# Patient Record
Sex: FEMALE | Race: WHITE | Employment: UNEMPLOYED | URBAN - METROPOLITAN AREA
[De-identification: names, ages, dates, MRNs, and addresses within clinical notes are randomized per-mention and may not be internally consistent; named-entity substitution may affect disease eponyms.]

---

## 2018-03-15 ENCOUNTER — HOSPITAL ENCOUNTER (OUTPATIENT)
Age: 58
Discharge: HOME | End: 2018-03-17
Payer: MEDICAID

## 2018-03-15 LAB
ALBUMIN SERPL-MCNC: 4.3 G/DL (ref 3.5–5.2)
ALP BLD-CCNC: 63 U/L (ref 35–104)
ALT SERPL-CCNC: 15 U/L (ref 0–32)
ANION GAP SERPL CALCULATED.3IONS-SCNC: 17 MMOL/L (ref 7–16)
AST SERPL-CCNC: 16 U/L (ref 0–31)
BILIRUB SERPL-MCNC: 0.4 MG/DL (ref 0–1.2)
BUN BLDV-MCNC: 19 MG/DL (ref 6–20)
CALCIUM SERPL-MCNC: 9.7 MG/DL (ref 8.6–10.2)
CHLORIDE BLD-SCNC: 100 MMOL/L (ref 98–107)
CHOLESTEROL, TOTAL: 182 MG/DL (ref 0–199)
CO2: 24 MMOL/L (ref 22–29)
CREAT SERPL-MCNC: 0.8 MG/DL (ref 0.5–1)
GFR AFRICAN AMERICAN: >60
GFR NON-AFRICAN AMERICAN: >60 ML/MIN/1.73
GLUCOSE BLD-MCNC: 114 MG/DL (ref 74–109)
HBA1C MFR BLD: 6.1 % (ref 4.8–5.9)
HDLC SERPL-MCNC: 31 MG/DL
LDL CHOLESTEROL CALCULATED: 116 MG/DL (ref 0–99)
POTASSIUM SERPL-SCNC: 4.2 MMOL/L (ref 3.5–5)
SODIUM BLD-SCNC: 141 MMOL/L (ref 132–146)
TOTAL PROTEIN: 8.5 G/DL (ref 6.4–8.3)
TRIGL SERPL-MCNC: 174 MG/DL (ref 0–149)
URIC ACID, SERUM: 6.9 MG/DL (ref 2.4–5.7)
VITAMIN D 25-HYDROXY: 45 NG/ML (ref 30–100)
VLDLC SERPL CALC-MCNC: 35 MG/DL

## 2018-03-15 PROCEDURE — 80053 COMPREHEN METABOLIC PANEL: CPT

## 2018-03-15 PROCEDURE — 80061 LIPID PANEL: CPT

## 2018-03-15 PROCEDURE — 83036 HEMOGLOBIN GLYCOSYLATED A1C: CPT

## 2018-03-15 PROCEDURE — 84550 ASSAY OF BLOOD/URIC ACID: CPT

## 2018-03-15 PROCEDURE — 82306 VITAMIN D 25 HYDROXY: CPT

## 2018-04-11 ENCOUNTER — HOSPITAL ENCOUNTER (OUTPATIENT)
Dept: GENERAL RADIOLOGY | Age: 58
Discharge: HOME | End: 2018-04-13
Payer: MEDICAID

## 2018-04-11 ENCOUNTER — HOSPITAL ENCOUNTER (OUTPATIENT)
Dept: CT IMAGING | Age: 58
Discharge: HOME | End: 2018-04-11
Payer: MEDICAID

## 2018-04-11 ENCOUNTER — HOSPITAL ENCOUNTER (OUTPATIENT)
Age: 58
Discharge: HOME | End: 2018-04-13
Payer: MEDICAID

## 2018-04-11 DIAGNOSIS — C64.1 MALIGNANT NEOPLASM OF RIGHT KIDNEY, EXCEPT RENAL PELVIS (HCC): ICD-10-CM

## 2018-04-11 PROCEDURE — 71046 X-RAY EXAM CHEST 2 VIEWS: CPT

## 2018-04-11 PROCEDURE — 74160 CT ABDOMEN W/CONTRAST: CPT

## 2018-04-11 PROCEDURE — 6360000004 HC RX CONTRAST MEDICATION: Performed by: RADIOLOGY

## 2018-04-11 RX ADMIN — IOPAMIDOL 80 ML: 755 INJECTION, SOLUTION INTRAVENOUS at 10:24

## 2018-06-26 ENCOUNTER — HOSPITAL ENCOUNTER (OUTPATIENT)
Age: 58
Discharge: HOME | End: 2018-06-28
Payer: MEDICAID

## 2018-06-26 PROCEDURE — 80048 BASIC METABOLIC PNL TOTAL CA: CPT

## 2018-06-27 LAB
ANION GAP SERPL CALCULATED.3IONS-SCNC: 17 MMOL/L (ref 7–16)
BUN BLDV-MCNC: 21 MG/DL (ref 6–20)
CALCIUM SERPL-MCNC: 9.9 MG/DL (ref 8.6–10.2)
CHLORIDE BLD-SCNC: 96 MMOL/L (ref 98–107)
CO2: 26 MMOL/L (ref 22–29)
CREAT SERPL-MCNC: 1 MG/DL (ref 0.5–1)
GFR AFRICAN AMERICAN: >60
GFR NON-AFRICAN AMERICAN: 57 ML/MIN/1.73
GLUCOSE BLD-MCNC: 106 MG/DL (ref 74–109)
POTASSIUM SERPL-SCNC: 4 MMOL/L (ref 3.5–5)
SODIUM BLD-SCNC: 139 MMOL/L (ref 132–146)

## 2018-11-06 ENCOUNTER — HOSPITAL ENCOUNTER (OUTPATIENT)
Dept: MAMMOGRAPHY | Age: 58
Discharge: HOME | End: 2018-11-08
Payer: MEDICAID

## 2018-11-06 DIAGNOSIS — Z12.39 BREAST CANCER SCREENING: ICD-10-CM

## 2018-11-06 PROCEDURE — 77067 SCR MAMMO BI INCL CAD: CPT

## 2018-12-10 ENCOUNTER — HOSPITAL ENCOUNTER (OUTPATIENT)
Age: 58
Discharge: HOME | End: 2018-12-12
Payer: MEDICAID

## 2018-12-10 LAB
ALBUMIN SERPL-MCNC: 4.2 G/DL (ref 3.5–5.2)
ALP BLD-CCNC: 59 U/L (ref 35–104)
ALT SERPL-CCNC: 11 U/L (ref 0–32)
ANION GAP SERPL CALCULATED.3IONS-SCNC: 14 MMOL/L (ref 7–16)
AST SERPL-CCNC: 12 U/L (ref 0–31)
BACTERIA: ABNORMAL /HPF
BASOPHILS ABSOLUTE: 0.03 E9/L (ref 0–0.2)
BASOPHILS RELATIVE PERCENT: 0.4 % (ref 0–2)
BILIRUB SERPL-MCNC: 0.5 MG/DL (ref 0–1.2)
BILIRUBIN URINE: NEGATIVE
BLOOD, URINE: ABNORMAL
BUN BLDV-MCNC: 21 MG/DL (ref 6–20)
CALCIUM SERPL-MCNC: 9.8 MG/DL (ref 8.6–10.2)
CHLORIDE BLD-SCNC: 99 MMOL/L (ref 98–107)
CHOLESTEROL, TOTAL: 168 MG/DL (ref 0–199)
CLARITY: ABNORMAL
CO2: 25 MMOL/L (ref 22–29)
COLOR: YELLOW
CREAT SERPL-MCNC: 1.1 MG/DL (ref 0.5–1)
EOSINOPHILS ABSOLUTE: 0.21 E9/L (ref 0.05–0.5)
EOSINOPHILS RELATIVE PERCENT: 3.1 % (ref 0–6)
GFR AFRICAN AMERICAN: >60
GFR NON-AFRICAN AMERICAN: 51 ML/MIN/1.73
GLUCOSE BLD-MCNC: 121 MG/DL (ref 74–99)
GLUCOSE URINE: NEGATIVE MG/DL
HBA1C MFR BLD: 5.7 % (ref 4–5.6)
HCT VFR BLD CALC: 38.1 % (ref 34–48)
HDLC SERPL-MCNC: 31 MG/DL
HEMOGLOBIN: 12.1 G/DL (ref 11.5–15.5)
IMMATURE GRANULOCYTES #: 0.03 E9/L
IMMATURE GRANULOCYTES %: 0.4 % (ref 0–5)
KETONES, URINE: NEGATIVE MG/DL
LDL CHOLESTEROL CALCULATED: 105 MG/DL (ref 0–99)
LEUKOCYTE ESTERASE, URINE: ABNORMAL
LYMPHOCYTES ABSOLUTE: 2.05 E9/L (ref 1.5–4)
LYMPHOCYTES RELATIVE PERCENT: 29.8 % (ref 20–42)
MCH RBC QN AUTO: 29.5 PG (ref 26–35)
MCHC RBC AUTO-ENTMCNC: 31.8 % (ref 32–34.5)
MCV RBC AUTO: 92.9 FL (ref 80–99.9)
MONOCYTES ABSOLUTE: 0.8 E9/L (ref 0.1–0.95)
MONOCYTES RELATIVE PERCENT: 11.6 % (ref 2–12)
NEUTROPHILS ABSOLUTE: 3.75 E9/L (ref 1.8–7.3)
NEUTROPHILS RELATIVE PERCENT: 54.7 % (ref 43–80)
NITRITE, URINE: NEGATIVE
PDW BLD-RTO: 13 FL (ref 11.5–15)
PH UA: 6 (ref 5–9)
PLATELET # BLD: 231 E9/L (ref 130–450)
PMV BLD AUTO: 11.6 FL (ref 7–12)
POTASSIUM SERPL-SCNC: 4.1 MMOL/L (ref 3.5–5)
PROTEIN UA: NEGATIVE MG/DL
RBC # BLD: 4.1 E12/L (ref 3.5–5.5)
RBC UA: ABNORMAL /HPF (ref 0–2)
SODIUM BLD-SCNC: 138 MMOL/L (ref 132–146)
SPECIFIC GRAVITY UA: <=1.005 (ref 1–1.03)
TOTAL PROTEIN: 8 G/DL (ref 6.4–8.3)
TRIGL SERPL-MCNC: 161 MG/DL (ref 0–149)
TSH SERPL DL<=0.05 MIU/L-ACNC: 2.37 UIU/ML (ref 0.27–4.2)
URIC ACID, SERUM: 7.1 MG/DL (ref 2.4–5.7)
UROBILINOGEN, URINE: 0.2 E.U./DL
VLDLC SERPL CALC-MCNC: 32 MG/DL
WBC # BLD: 6.9 E9/L (ref 4.5–11.5)
WBC UA: ABNORMAL /HPF (ref 0–5)

## 2018-12-10 PROCEDURE — 85025 COMPLETE CBC W/AUTO DIFF WBC: CPT

## 2018-12-10 PROCEDURE — 84550 ASSAY OF BLOOD/URIC ACID: CPT

## 2018-12-10 PROCEDURE — 80061 LIPID PANEL: CPT

## 2018-12-10 PROCEDURE — 80053 COMPREHEN METABOLIC PANEL: CPT

## 2018-12-10 PROCEDURE — 82044 UR ALBUMIN SEMIQUANTITATIVE: CPT

## 2018-12-10 PROCEDURE — 81001 URINALYSIS AUTO W/SCOPE: CPT

## 2018-12-10 PROCEDURE — 82570 ASSAY OF URINE CREATININE: CPT

## 2018-12-10 PROCEDURE — 83036 HEMOGLOBIN GLYCOSYLATED A1C: CPT

## 2018-12-10 PROCEDURE — 84443 ASSAY THYROID STIM HORMONE: CPT

## 2018-12-11 LAB
CREATININE URINE: 91 MG/DL (ref 29–226)
MICROALBUMIN UR-MCNC: <12 MG/L
MICROALBUMIN/CREAT UR-RTO: ABNORMAL (ref 0–30)

## 2019-05-01 ENCOUNTER — HOSPITAL ENCOUNTER (OUTPATIENT)
Dept: ULTRASOUND IMAGING | Age: 59
Discharge: HOME | End: 2019-05-01
Payer: MEDICAID

## 2019-05-01 DIAGNOSIS — R10.10 UPPER ABDOMINAL PAIN: ICD-10-CM

## 2019-05-01 PROCEDURE — 76705 ECHO EXAM OF ABDOMEN: CPT

## 2019-05-24 ENCOUNTER — HOSPITAL ENCOUNTER (OUTPATIENT)
Dept: NUCLEAR MEDICINE | Age: 59
Discharge: HOME | End: 2019-05-24
Payer: MEDICAID

## 2019-05-24 VITALS — BODY MASS INDEX: 37.36 KG/M2 | WEIGHT: 235 LBS

## 2019-05-24 DIAGNOSIS — R11.0 NAUSEA: ICD-10-CM

## 2019-05-24 DIAGNOSIS — R10.11 ABDOMINAL PAIN, RIGHT UPPER QUADRANT: ICD-10-CM

## 2019-05-24 PROCEDURE — 3430000000 HC RX DIAGNOSTIC RADIOPHARMACEUTICAL: Performed by: RADIOLOGY

## 2019-05-24 PROCEDURE — 78227 HEPATOBIL SYST IMAGE W/DRUG: CPT

## 2019-05-24 PROCEDURE — 6360000004 HC RX CONTRAST MEDICATION: Performed by: RADIOLOGY

## 2019-05-24 PROCEDURE — 2580000003 HC RX 258: Performed by: RADIOLOGY

## 2019-05-24 PROCEDURE — A9537 TC99M MEBROFENIN: HCPCS | Performed by: RADIOLOGY

## 2019-05-24 RX ADMIN — Medication 6 MILLICURIE: at 09:59

## 2019-05-24 RX ADMIN — SODIUM CHLORIDE 2.13 MCG: 9 INJECTION, SOLUTION INTRAVENOUS at 12:06

## 2019-07-18 ENCOUNTER — OFFICE VISIT (OUTPATIENT)
Dept: FAMILY MEDICINE CLINIC | Age: 59
End: 2019-07-18
Payer: MEDICAID

## 2019-07-18 VITALS
SYSTOLIC BLOOD PRESSURE: 110 MMHG | HEART RATE: 69 BPM | HEIGHT: 67 IN | DIASTOLIC BLOOD PRESSURE: 62 MMHG | TEMPERATURE: 98.1 F | OXYGEN SATURATION: 97 % | WEIGHT: 229 LBS | BODY MASS INDEX: 35.94 KG/M2

## 2019-07-18 DIAGNOSIS — I10 ESSENTIAL HYPERTENSION: Primary | ICD-10-CM

## 2019-07-18 DIAGNOSIS — R73.01 ABNORMAL FASTING GLUCOSE: ICD-10-CM

## 2019-07-18 DIAGNOSIS — F17.200 SMOKER: ICD-10-CM

## 2019-07-18 DIAGNOSIS — C64.1 RENAL CELL CARCINOMA OF RIGHT KIDNEY (HCC): ICD-10-CM

## 2019-07-18 PROCEDURE — 99204 OFFICE O/P NEW MOD 45 MIN: CPT | Performed by: INTERNAL MEDICINE

## 2019-07-18 PROCEDURE — 4004F PT TOBACCO SCREEN RCVD TLK: CPT | Performed by: INTERNAL MEDICINE

## 2019-07-18 PROCEDURE — G8427 DOCREV CUR MEDS BY ELIG CLIN: HCPCS | Performed by: INTERNAL MEDICINE

## 2019-07-18 PROCEDURE — G8419 CALC BMI OUT NRM PARAM NOF/U: HCPCS | Performed by: INTERNAL MEDICINE

## 2019-07-18 PROCEDURE — 3017F COLORECTAL CA SCREEN DOC REV: CPT | Performed by: INTERNAL MEDICINE

## 2019-07-18 RX ORDER — LISINOPRIL 5 MG/1
5 TABLET ORAL DAILY
Refills: 0 | COMMUNITY
Start: 2019-07-02 | End: 2019-07-18 | Stop reason: SDUPTHER

## 2019-07-18 RX ORDER — LISINOPRIL 5 MG/1
5 TABLET ORAL DAILY
Qty: 90 TABLET | Refills: 0 | Status: SHIPPED | OUTPATIENT
Start: 2019-07-18 | End: 2019-11-01 | Stop reason: SDUPTHER

## 2019-07-18 RX ORDER — ALPRAZOLAM 0.5 MG/1
0.5 TABLET ORAL DAILY PRN
Refills: 0 | COMMUNITY
Start: 2019-04-30 | End: 2020-02-27

## 2019-07-18 RX ORDER — TRIAMTERENE AND HYDROCHLOROTHIAZIDE 37.5; 25 MG/1; MG/1
1 TABLET ORAL DAILY
Qty: 90 TABLET | Refills: 0 | Status: SHIPPED | OUTPATIENT
Start: 2019-07-18 | End: 2019-11-01 | Stop reason: SDUPTHER

## 2019-07-18 RX ORDER — CARVEDILOL 12.5 MG/1
12.5 TABLET ORAL 2 TIMES DAILY WITH MEALS
Qty: 180 TABLET | Refills: 0 | Status: SHIPPED | OUTPATIENT
Start: 2019-07-18 | End: 2019-11-01 | Stop reason: SDUPTHER

## 2019-07-18 ASSESSMENT — ENCOUNTER SYMPTOMS
SHORTNESS OF BREATH: 0
EYE PAIN: 0
EYE DISCHARGE: 0
BLOOD IN STOOL: 0
ABDOMINAL PAIN: 0
NAUSEA: 0
SORE THROAT: 0
SINUS PAIN: 0

## 2019-07-18 ASSESSMENT — PATIENT HEALTH QUESTIONNAIRE - PHQ9
SUM OF ALL RESPONSES TO PHQ QUESTIONS 1-9: 0
2. FEELING DOWN, DEPRESSED OR HOPELESS: 0
SUM OF ALL RESPONSES TO PHQ QUESTIONS 1-9: 0
SUM OF ALL RESPONSES TO PHQ9 QUESTIONS 1 & 2: 0
1. LITTLE INTEREST OR PLEASURE IN DOING THINGS: 0

## 2019-08-14 ENCOUNTER — HOSPITAL ENCOUNTER (OUTPATIENT)
Age: 59
Discharge: HOME | End: 2019-08-16
Payer: MEDICAID

## 2019-08-14 DIAGNOSIS — R73.01 ABNORMAL FASTING GLUCOSE: ICD-10-CM

## 2019-08-14 DIAGNOSIS — I10 ESSENTIAL HYPERTENSION: ICD-10-CM

## 2019-08-14 LAB
ALBUMIN SERPL-MCNC: 4 G/DL (ref 3.5–5.2)
ALP BLD-CCNC: 64 U/L (ref 35–104)
ALT SERPL-CCNC: 11 U/L (ref 0–32)
ANION GAP SERPL CALCULATED.3IONS-SCNC: 10 MMOL/L (ref 7–16)
AST SERPL-CCNC: 12 U/L (ref 0–31)
BACTERIA: ABNORMAL /HPF
BASOPHILS ABSOLUTE: 0.03 E9/L (ref 0–0.2)
BASOPHILS RELATIVE PERCENT: 0.5 % (ref 0–2)
BILIRUB SERPL-MCNC: 0.2 MG/DL (ref 0–1.2)
BILIRUBIN URINE: NEGATIVE
BLOOD, URINE: NEGATIVE
BUN BLDV-MCNC: 20 MG/DL (ref 6–20)
CALCIUM SERPL-MCNC: 9.6 MG/DL (ref 8.6–10.2)
CHLORIDE BLD-SCNC: 106 MMOL/L (ref 98–107)
CHOLESTEROL, FASTING: 158 MG/DL (ref 0–199)
CLARITY: CLEAR
CO2: 25 MMOL/L (ref 22–29)
COLOR: YELLOW
CREAT SERPL-MCNC: 1.1 MG/DL (ref 0.5–1)
EOSINOPHILS ABSOLUTE: 0.19 E9/L (ref 0.05–0.5)
EOSINOPHILS RELATIVE PERCENT: 3.2 % (ref 0–6)
GFR AFRICAN AMERICAN: >60
GFR NON-AFRICAN AMERICAN: 51 ML/MIN/1.73
GLUCOSE FASTING: 106 MG/DL (ref 74–99)
GLUCOSE URINE: NEGATIVE MG/DL
HBA1C MFR BLD: 5.3 % (ref 4–5.6)
HCT VFR BLD CALC: 35.4 % (ref 34–48)
HDLC SERPL-MCNC: 28 MG/DL
HEMOGLOBIN: 10.9 G/DL (ref 11.5–15.5)
IMMATURE GRANULOCYTES #: 0.03 E9/L
IMMATURE GRANULOCYTES %: 0.5 % (ref 0–5)
KETONES, URINE: NEGATIVE MG/DL
LDL CHOLESTEROL CALCULATED: 88 MG/DL (ref 0–99)
LEUKOCYTE ESTERASE, URINE: ABNORMAL
LYMPHOCYTES ABSOLUTE: 2.27 E9/L (ref 1.5–4)
LYMPHOCYTES RELATIVE PERCENT: 37.7 % (ref 20–42)
MCH RBC QN AUTO: 30 PG (ref 26–35)
MCHC RBC AUTO-ENTMCNC: 30.8 % (ref 32–34.5)
MCV RBC AUTO: 97.5 FL (ref 80–99.9)
MONOCYTES ABSOLUTE: 0.6 E9/L (ref 0.1–0.95)
MONOCYTES RELATIVE PERCENT: 10 % (ref 2–12)
NEUTROPHILS ABSOLUTE: 2.9 E9/L (ref 1.8–7.3)
NEUTROPHILS RELATIVE PERCENT: 48.1 % (ref 43–80)
NITRITE, URINE: NEGATIVE
PDW BLD-RTO: 14.4 FL (ref 11.5–15)
PH UA: 6 (ref 5–9)
PLATELET # BLD: 220 E9/L (ref 130–450)
PMV BLD AUTO: 10.7 FL (ref 7–12)
POTASSIUM SERPL-SCNC: 4.2 MMOL/L (ref 3.5–5)
PROTEIN UA: NEGATIVE MG/DL
RBC # BLD: 3.63 E12/L (ref 3.5–5.5)
RBC UA: ABNORMAL /HPF (ref 0–2)
SODIUM BLD-SCNC: 141 MMOL/L (ref 132–146)
SPECIFIC GRAVITY UA: 1.01 (ref 1–1.03)
TOTAL PROTEIN: 7.9 G/DL (ref 6.4–8.3)
TRIGLYCERIDE, FASTING: 210 MG/DL (ref 0–149)
TSH SERPL DL<=0.05 MIU/L-ACNC: 1.52 UIU/ML (ref 0.27–4.2)
UROBILINOGEN, URINE: 0.2 E.U./DL
VLDLC SERPL CALC-MCNC: 42 MG/DL
WBC # BLD: 6 E9/L (ref 4.5–11.5)
WBC UA: ABNORMAL /HPF (ref 0–5)

## 2019-08-14 PROCEDURE — 85025 COMPLETE CBC W/AUTO DIFF WBC: CPT

## 2019-08-14 PROCEDURE — 84443 ASSAY THYROID STIM HORMONE: CPT

## 2019-08-14 PROCEDURE — 83036 HEMOGLOBIN GLYCOSYLATED A1C: CPT

## 2019-08-14 PROCEDURE — 81001 URINALYSIS AUTO W/SCOPE: CPT

## 2019-08-14 PROCEDURE — 80053 COMPREHEN METABOLIC PANEL: CPT

## 2019-08-14 PROCEDURE — 36415 COLL VENOUS BLD VENIPUNCTURE: CPT

## 2019-08-14 PROCEDURE — 80061 LIPID PANEL: CPT

## 2019-08-22 ENCOUNTER — OFFICE VISIT (OUTPATIENT)
Dept: FAMILY MEDICINE CLINIC | Age: 59
End: 2019-08-22
Payer: MEDICAID

## 2019-08-22 VITALS
HEIGHT: 66 IN | WEIGHT: 223 LBS | OXYGEN SATURATION: 97 % | TEMPERATURE: 96.9 F | SYSTOLIC BLOOD PRESSURE: 110 MMHG | HEART RATE: 66 BPM | BODY MASS INDEX: 35.84 KG/M2 | DIASTOLIC BLOOD PRESSURE: 68 MMHG

## 2019-08-22 DIAGNOSIS — R94.31 ABNORMAL EKG: ICD-10-CM

## 2019-08-22 DIAGNOSIS — F17.200 SMOKER: ICD-10-CM

## 2019-08-22 DIAGNOSIS — D64.9 ANEMIA, UNSPECIFIED TYPE: ICD-10-CM

## 2019-08-22 DIAGNOSIS — Z01.818 PRE-OP EXAM: Primary | ICD-10-CM

## 2019-08-22 DIAGNOSIS — I10 ESSENTIAL HYPERTENSION: ICD-10-CM

## 2019-08-22 DIAGNOSIS — C64.1 RENAL CELL CARCINOMA OF RIGHT KIDNEY (HCC): ICD-10-CM

## 2019-08-22 PROCEDURE — 99214 OFFICE O/P EST MOD 30 MIN: CPT | Performed by: INTERNAL MEDICINE

## 2019-08-22 PROCEDURE — 3017F COLORECTAL CA SCREEN DOC REV: CPT | Performed by: INTERNAL MEDICINE

## 2019-08-22 PROCEDURE — G8427 DOCREV CUR MEDS BY ELIG CLIN: HCPCS | Performed by: INTERNAL MEDICINE

## 2019-08-22 PROCEDURE — 4004F PT TOBACCO SCREEN RCVD TLK: CPT | Performed by: INTERNAL MEDICINE

## 2019-08-22 PROCEDURE — 93000 ELECTROCARDIOGRAM COMPLETE: CPT | Performed by: INTERNAL MEDICINE

## 2019-08-22 PROCEDURE — G8417 CALC BMI ABV UP PARAM F/U: HCPCS | Performed by: INTERNAL MEDICINE

## 2019-08-22 ASSESSMENT — ENCOUNTER SYMPTOMS
SHORTNESS OF BREATH: 0
EYE DISCHARGE: 0
EYE PAIN: 0
NAUSEA: 0
ABDOMINAL PAIN: 0
SINUS PAIN: 0
SORE THROAT: 0
BLOOD IN STOOL: 0

## 2019-08-22 NOTE — PROGRESS NOTES
applicable, discussed with patient. Pt is stable on current medical treatment. Continue current treatment plan    Side effects/Adverse effects/Precautions are reviewed with patient. Ref to Dr Khanh Reina         There are no Patient Instructions on file for this visit. Return in about 2 months (around 10/22/2019).

## 2019-11-01 RX ORDER — TRIAMTERENE AND HYDROCHLOROTHIAZIDE 37.5; 25 MG/1; MG/1
1 TABLET ORAL DAILY
Qty: 90 TABLET | Refills: 0 | Status: SHIPPED
Start: 2019-11-01 | End: 2020-02-06 | Stop reason: SDUPTHER

## 2019-11-01 RX ORDER — LISINOPRIL 5 MG/1
5 TABLET ORAL DAILY
Qty: 90 TABLET | Refills: 0 | Status: SHIPPED
Start: 2019-11-01 | End: 2020-02-27

## 2019-11-01 RX ORDER — CARVEDILOL 12.5 MG/1
12.5 TABLET ORAL 2 TIMES DAILY WITH MEALS
Qty: 180 TABLET | Refills: 0 | Status: SHIPPED
Start: 2019-11-01 | End: 2020-02-06 | Stop reason: SDUPTHER

## 2019-12-05 ENCOUNTER — OFFICE VISIT (OUTPATIENT)
Dept: FAMILY MEDICINE CLINIC | Age: 59
End: 2019-12-05
Payer: MEDICAID

## 2019-12-05 VITALS
HEART RATE: 67 BPM | HEIGHT: 66 IN | WEIGHT: 216 LBS | OXYGEN SATURATION: 96 % | BODY MASS INDEX: 34.72 KG/M2 | SYSTOLIC BLOOD PRESSURE: 92 MMHG | DIASTOLIC BLOOD PRESSURE: 62 MMHG

## 2019-12-05 DIAGNOSIS — J01.90 ACUTE NON-RECURRENT SINUSITIS, UNSPECIFIED LOCATION: Primary | ICD-10-CM

## 2019-12-05 DIAGNOSIS — F41.9 ANXIETY: ICD-10-CM

## 2019-12-05 DIAGNOSIS — I10 ESSENTIAL HYPERTENSION: ICD-10-CM

## 2019-12-05 DIAGNOSIS — F17.200 SMOKER: ICD-10-CM

## 2019-12-05 DIAGNOSIS — D64.9 ANEMIA, UNSPECIFIED TYPE: ICD-10-CM

## 2019-12-05 DIAGNOSIS — C64.1 RENAL CELL CARCINOMA OF RIGHT KIDNEY (HCC): ICD-10-CM

## 2019-12-05 PROCEDURE — 99213 OFFICE O/P EST LOW 20 MIN: CPT | Performed by: INTERNAL MEDICINE

## 2019-12-05 PROCEDURE — 3017F COLORECTAL CA SCREEN DOC REV: CPT | Performed by: INTERNAL MEDICINE

## 2019-12-05 PROCEDURE — 4004F PT TOBACCO SCREEN RCVD TLK: CPT | Performed by: INTERNAL MEDICINE

## 2019-12-05 PROCEDURE — G8427 DOCREV CUR MEDS BY ELIG CLIN: HCPCS | Performed by: INTERNAL MEDICINE

## 2019-12-05 PROCEDURE — G8417 CALC BMI ABV UP PARAM F/U: HCPCS | Performed by: INTERNAL MEDICINE

## 2019-12-05 PROCEDURE — G8484 FLU IMMUNIZE NO ADMIN: HCPCS | Performed by: INTERNAL MEDICINE

## 2019-12-05 RX ORDER — OMEPRAZOLE 20 MG/1
CAPSULE, DELAYED RELEASE ORAL
Refills: 3 | COMMUNITY
Start: 2019-11-14 | End: 2022-07-11

## 2019-12-05 RX ORDER — AZITHROMYCIN 250 MG/1
TABLET, FILM COATED ORAL
Qty: 1 PACKET | Refills: 0 | Status: SHIPPED
Start: 2019-12-05 | End: 2020-02-27

## 2019-12-05 ASSESSMENT — ENCOUNTER SYMPTOMS
SORE THROAT: 0
COUGH: 1
BLOOD IN STOOL: 0
ABDOMINAL PAIN: 0
EYE DISCHARGE: 0
EYE PAIN: 0
SINUS PRESSURE: 1
SHORTNESS OF BREATH: 0
NAUSEA: 0
SINUS PAIN: 1

## 2020-02-05 ENCOUNTER — TELEPHONE (OUTPATIENT)
Dept: FAMILY MEDICINE CLINIC | Age: 60
End: 2020-02-05

## 2020-02-05 NOTE — TELEPHONE ENCOUNTER
Pt left  msg requesting refill but didn't leave the med names. Pt also needs scheduled for f/u appt. Called pt back and left  msg to return my call.

## 2020-02-06 RX ORDER — CARVEDILOL 12.5 MG/1
12.5 TABLET ORAL 2 TIMES DAILY WITH MEALS
Qty: 180 TABLET | Refills: 0 | Status: SHIPPED
Start: 2020-02-06 | End: 2020-02-27 | Stop reason: SDUPTHER

## 2020-02-06 RX ORDER — TRIAMTERENE AND HYDROCHLOROTHIAZIDE 37.5; 25 MG/1; MG/1
1 TABLET ORAL DAILY
Qty: 90 TABLET | Refills: 0 | Status: SHIPPED
Start: 2020-02-06 | End: 2020-02-27 | Stop reason: SDUPTHER

## 2020-02-27 ENCOUNTER — OFFICE VISIT (OUTPATIENT)
Dept: FAMILY MEDICINE CLINIC | Age: 60
End: 2020-02-27
Payer: MEDICAID

## 2020-02-27 VITALS
OXYGEN SATURATION: 96 % | DIASTOLIC BLOOD PRESSURE: 60 MMHG | HEIGHT: 67 IN | TEMPERATURE: 98.1 F | HEART RATE: 69 BPM | WEIGHT: 220 LBS | SYSTOLIC BLOOD PRESSURE: 106 MMHG | BODY MASS INDEX: 34.53 KG/M2

## 2020-02-27 PROCEDURE — G8417 CALC BMI ABV UP PARAM F/U: HCPCS | Performed by: INTERNAL MEDICINE

## 2020-02-27 PROCEDURE — G8427 DOCREV CUR MEDS BY ELIG CLIN: HCPCS | Performed by: INTERNAL MEDICINE

## 2020-02-27 PROCEDURE — 99214 OFFICE O/P EST MOD 30 MIN: CPT | Performed by: INTERNAL MEDICINE

## 2020-02-27 PROCEDURE — 4004F PT TOBACCO SCREEN RCVD TLK: CPT | Performed by: INTERNAL MEDICINE

## 2020-02-27 PROCEDURE — G8484 FLU IMMUNIZE NO ADMIN: HCPCS | Performed by: INTERNAL MEDICINE

## 2020-02-27 PROCEDURE — 3017F COLORECTAL CA SCREEN DOC REV: CPT | Performed by: INTERNAL MEDICINE

## 2020-02-27 RX ORDER — CARVEDILOL 12.5 MG/1
12.5 TABLET ORAL 2 TIMES DAILY WITH MEALS
Qty: 180 TABLET | Refills: 0 | Status: CANCELLED | OUTPATIENT
Start: 2020-02-27 | End: 2020-05-27

## 2020-02-27 RX ORDER — TRIAMTERENE AND HYDROCHLOROTHIAZIDE 37.5; 25 MG/1; MG/1
1 TABLET ORAL DAILY
Qty: 90 TABLET | Refills: 0 | Status: SHIPPED
Start: 2020-02-27 | End: 2022-07-11

## 2020-02-27 RX ORDER — CARVEDILOL 6.25 MG/1
6.25 TABLET ORAL 2 TIMES DAILY WITH MEALS
Qty: 180 TABLET | Refills: 0 | Status: SHIPPED
Start: 2020-02-27 | End: 2022-07-11

## 2020-02-27 ASSESSMENT — ENCOUNTER SYMPTOMS
SHORTNESS OF BREATH: 0
BLOOD IN STOOL: 0
NAUSEA: 0
EYE DISCHARGE: 0
EYE PAIN: 0
ABDOMINAL PAIN: 0
SORE THROAT: 0
SINUS PAIN: 0

## 2020-02-27 ASSESSMENT — PATIENT HEALTH QUESTIONNAIRE - PHQ9
SUM OF ALL RESPONSES TO PHQ QUESTIONS 1-9: 0
2. FEELING DOWN, DEPRESSED OR HOPELESS: 0
SUM OF ALL RESPONSES TO PHQ9 QUESTIONS 1 & 2: 0
SUM OF ALL RESPONSES TO PHQ QUESTIONS 1-9: 0
1. LITTLE INTEREST OR PLEASURE IN DOING THINGS: 0

## 2020-02-27 NOTE — PROGRESS NOTES
Chief Complaint   Patient presents with    Hypertension     6 wk f/u    Health Maintenance     agrees to [facility], refuses flu and pneumo vaccines. HPI:  Patient is here for follow-up     Pt had Gi work up, EGD and colonoscopy    Pt says she is feeling better, No further abd pain    BP is low , she stopped Lisinopril since dec         Allergy and Medications are reviewed and updated. Past Medical History, Surgical History, and Family History has been reviewed and updated. Review of Systems:  Review of Systems   Constitutional: Negative for chills and fever. HENT: Negative for congestion, sinus pain and sore throat. Eyes: Negative for pain and discharge. Respiratory: Negative for shortness of breath (No new SOb). Cardiovascular: Negative for chest pain. Gastrointestinal: Negative for abdominal pain, blood in stool and nausea. Genitourinary: Negative for flank pain and frequency. Musculoskeletal: Negative for neck pain. Hematological: Does not bruise/bleed easily. Psychiatric/Behavioral: Negative for suicidal ideas. Vitals:    02/27/20 1005   BP: 106/60   Pulse: 69   Temp: 98.1 °F (36.7 °C)   SpO2: 96%   Weight: 220 lb (99.8 kg)   Height: 5' 6.5\" (1.689 m)       Physical Exam  Vitals signs reviewed. Constitutional:       Appearance: She is well-developed. HENT:      Head: Normocephalic and atraumatic. Mouth/Throat:      Pharynx: No oropharyngeal exudate. Eyes:      Conjunctiva/sclera: Conjunctivae normal.      Pupils: Pupils are equal, round, and reactive to light. Neck:      Vascular: No JVD. Cardiovascular:      Rate and Rhythm: Normal rate and regular rhythm. Pulmonary:      Effort: Pulmonary effort is normal.      Breath sounds: Normal breath sounds. No rales. Abdominal:      General: Bowel sounds are normal.      Palpations: Abdomen is soft. Musculoskeletal: Normal range of motion. Lymphadenopathy:      Cervical: No cervical adenopathy.    Skin: General: Skin is warm and dry. Neurological:      Mental Status: She is alert and oriented to person, place, and time. Psychiatric:         Behavior: Behavior normal.          Labs :    Lab Results   Component Value Date    WBC 6.0 08/14/2019    HGB 10.9 (L) 08/14/2019    HCT 35.4 08/14/2019     08/14/2019    CHOL 168 12/10/2018    TRIG 161 (H) 12/10/2018    HDL 28 08/14/2019    ALT 11 08/14/2019    AST 12 08/14/2019     08/14/2019    K 4.2 08/14/2019     08/14/2019    CREATININE 1.1 (H) 08/14/2019    BUN 20 08/14/2019    CO2 25 08/14/2019    TSH 1.520 08/14/2019    GLUF 106 (H) 08/14/2019    LABA1C 5.3 08/14/2019    LABMICR <12.0 12/10/2018     Lab Results   Component Value Date    COLORU Yellow 08/14/2019    NITRU Negative 08/14/2019    GLUCOSEU Negative 08/14/2019    KETUA Negative 08/14/2019    UROBILINOGEN 0.2 08/14/2019    BILIRUBINUR Negative 08/14/2019           ASSESSMENT     Patient Active Problem List    Diagnosis Date Noted    Anxiety 12/05/2019    Anemia 12/05/2019    Essential hypertension 07/18/2019    Smoker 07/18/2019    Renal cell carcinoma of right kidney ([facility] [address] 10/10/2016    Right renal mass 10/03/2016        Diagnosis:     ICD-10-CM    1. Essential hypertension I10 Comprehensive Metabolic Panel, Fasting     Lipid, Fasting     TSH without Reflex   2. Smoker F17.200    3. Breast cancer screening by mammogram Z12.31 BLAISE DIGITAL SCREEN W CAD BILATERAL   4. Renal cell carcinoma of right kidney (HCC) C64.1    5. Anemia, unspecified type D64.9 CBC Auto Differential       PLAN:      Decrease Carvedilol to 6.25 bid     Monitor BP and Keep a log, Bring it with next visit      Pt is stable on current medical treatment. Continue current treatment plan    Side effects/Adverse effects/Precautions are reviewed with patient.      Low salt, Low Carb diet an low fat diet  Continue medications as advised and take them regularly  Regular exercises as advised    Discussed natural and

## 2021-04-09 ENCOUNTER — HOSPITAL ENCOUNTER (OUTPATIENT)
Age: 61
Discharge: HOME | End: 2021-04-09
Payer: MEDICAID

## 2021-04-09 PROCEDURE — 87088 URINE BACTERIA CULTURE: CPT

## 2021-04-11 LAB — URINE CULTURE, ROUTINE: NORMAL

## 2021-09-28 ENCOUNTER — APPOINTMENT (OUTPATIENT)
Dept: CT IMAGING | Age: 61
End: 2021-09-28
Payer: MEDICAID

## 2021-09-28 ENCOUNTER — HOSPITAL ENCOUNTER (EMERGENCY)
Age: 61
Discharge: HOME | End: 2021-09-28
Payer: MEDICAID

## 2021-09-28 VITALS
SYSTOLIC BLOOD PRESSURE: 196 MMHG | HEART RATE: 80 BPM | DIASTOLIC BLOOD PRESSURE: 89 MMHG | RESPIRATION RATE: 18 BRPM | OXYGEN SATURATION: 95 % | TEMPERATURE: 97.7 F

## 2021-09-28 DIAGNOSIS — R19.00 ABDOMINAL MASS, UNSPECIFIED ABDOMINAL LOCATION: Primary | ICD-10-CM

## 2021-09-28 LAB
ALBUMIN SERPL-MCNC: 4.1 G/DL (ref 3.5–5.2)
ALP BLD-CCNC: 89 U/L (ref 35–104)
ALT SERPL-CCNC: 12 U/L (ref 0–32)
ANION GAP SERPL CALCULATED.3IONS-SCNC: 11 MMOL/L (ref 7–16)
AST SERPL-CCNC: 15 U/L (ref 0–31)
BACTERIA: ABNORMAL /HPF
BASOPHILS ABSOLUTE: 0.04 E9/L (ref 0–0.2)
BASOPHILS RELATIVE PERCENT: 0.5 % (ref 0–2)
BILIRUB SERPL-MCNC: 0.3 MG/DL (ref 0–1.2)
BILIRUBIN URINE: NEGATIVE
BLOOD, URINE: ABNORMAL
BUN BLDV-MCNC: 10 MG/DL (ref 6–23)
CALCIUM SERPL-MCNC: 9.7 MG/DL (ref 8.6–10.2)
CHLORIDE BLD-SCNC: 102 MMOL/L (ref 98–107)
CLARITY: CLEAR
CO2: 26 MMOL/L (ref 22–29)
COLOR: YELLOW
CREAT SERPL-MCNC: 0.8 MG/DL (ref 0.5–1)
EOSINOPHILS ABSOLUTE: 0.13 E9/L (ref 0.05–0.5)
EOSINOPHILS RELATIVE PERCENT: 1.6 % (ref 0–6)
EPITHELIAL CELLS, UA: ABNORMAL /HPF
GFR AFRICAN AMERICAN: >60
GFR NON-AFRICAN AMERICAN: >60 ML/MIN/1.73
GLUCOSE BLD-MCNC: 134 MG/DL (ref 74–99)
GLUCOSE URINE: NEGATIVE MG/DL
HCT VFR BLD CALC: 42.9 % (ref 34–48)
HEMOGLOBIN: 13.8 G/DL (ref 11.5–15.5)
IMMATURE GRANULOCYTES #: 0.02 E9/L
IMMATURE GRANULOCYTES %: 0.2 % (ref 0–5)
KETONES, URINE: NEGATIVE MG/DL
LEUKOCYTE ESTERASE, URINE: ABNORMAL
LIPASE: 29 U/L (ref 13–60)
LYMPHOCYTES ABSOLUTE: 2.31 E9/L (ref 1.5–4)
LYMPHOCYTES RELATIVE PERCENT: 27.8 % (ref 20–42)
MCH RBC QN AUTO: 28.4 PG (ref 26–35)
MCHC RBC AUTO-ENTMCNC: 32.2 % (ref 32–34.5)
MCV RBC AUTO: 88.3 FL (ref 80–99.9)
MONOCYTES ABSOLUTE: 0.79 E9/L (ref 0.1–0.95)
MONOCYTES RELATIVE PERCENT: 9.5 % (ref 2–12)
NEUTROPHILS ABSOLUTE: 5.02 E9/L (ref 1.8–7.3)
NEUTROPHILS RELATIVE PERCENT: 60.4 % (ref 43–80)
NITRITE, URINE: NEGATIVE
PDW BLD-RTO: 13.9 FL (ref 11.5–15)
PH UA: 6 (ref 5–9)
PLATELET # BLD: 260 E9/L (ref 130–450)
PMV BLD AUTO: 9.8 FL (ref 7–12)
POTASSIUM REFLEX MAGNESIUM: 3.7 MMOL/L (ref 3.5–5)
PROTEIN UA: NEGATIVE MG/DL
RBC # BLD: 4.86 E12/L (ref 3.5–5.5)
RBC UA: ABNORMAL /HPF (ref 0–2)
SODIUM BLD-SCNC: 139 MMOL/L (ref 132–146)
SPECIFIC GRAVITY UA: 1.01 (ref 1–1.03)
TOTAL PROTEIN: 8.9 G/DL (ref 6.4–8.3)
UROBILINOGEN, URINE: 0.2 E.U./DL
WBC # BLD: 8.3 E9/L (ref 4.5–11.5)
WBC UA: ABNORMAL /HPF (ref 0–5)

## 2021-09-28 PROCEDURE — 74177 CT ABD & PELVIS W/CONTRAST: CPT

## 2021-09-28 PROCEDURE — 99282 EMERGENCY DEPT VISIT SF MDM: CPT

## 2021-09-28 PROCEDURE — 6360000004 HC RX CONTRAST MEDICATION: Performed by: RADIOLOGY

## 2021-09-28 PROCEDURE — 85025 COMPLETE CBC W/AUTO DIFF WBC: CPT

## 2021-09-28 PROCEDURE — 80053 COMPREHEN METABOLIC PANEL: CPT

## 2021-09-28 PROCEDURE — 83690 ASSAY OF LIPASE: CPT

## 2021-09-28 PROCEDURE — 81001 URINALYSIS AUTO W/SCOPE: CPT

## 2021-09-28 RX ORDER — ONDANSETRON 4 MG/1
4 TABLET, ORALLY DISINTEGRATING ORAL 3 TIMES DAILY PRN
Qty: 21 TABLET | Refills: 0 | Status: SHIPPED | OUTPATIENT
Start: 2021-09-28 | End: 2022-07-11

## 2021-09-28 RX ORDER — DICYCLOMINE HYDROCHLORIDE 10 MG/1
10 CAPSULE ORAL 4 TIMES DAILY
Qty: 20 CAPSULE | Refills: 0 | Status: ON HOLD | OUTPATIENT
Start: 2021-09-28 | End: 2022-02-14

## 2021-09-28 RX ADMIN — IOPAMIDOL 75 ML: 755 INJECTION, SOLUTION INTRAVENOUS at 15:14

## 2021-09-28 ASSESSMENT — PAIN DESCRIPTION - LOCATION: LOCATION: ABDOMEN

## 2021-09-28 ASSESSMENT — PAIN DESCRIPTION - PAIN TYPE: TYPE: ACUTE PAIN

## 2021-09-28 ASSESSMENT — PAIN SCALES - GENERAL: PAINLEVEL_OUTOF10: 7

## 2021-09-28 NOTE — ED PROVIDER NOTES
Penicillins, Tylenol [acetaminophen], and Nuts [peanut-containing drug products]    -------------------------------------------------- RESULTS -------------------------------------------------  All laboratory and radiology results have been personally reviewed by myself   LABS:  Results for orders placed or performed during the hospital encounter of 09/28/21   CBC Auto Differential   Result Value Ref Range    WBC 8.3 4.5 - 11.5 E9/L    RBC 4.86 3.50 - 5.50 E12/L    Hemoglobin 13.8 11.5 - 15.5 g/dL    Hematocrit 42.9 34.0 - 48.0 %    MCV 88.3 80.0 - 99.9 fL    MCH 28.4 26.0 - 35.0 pg    MCHC 32.2 32.0 - 34.5 %    RDW 13.9 11.5 - 15.0 fL    Platelets 734 842 - 560 E9/L    MPV 9.8 7.0 - 12.0 fL    Neutrophils % 60.4 43.0 - 80.0 %    Immature Granulocytes % 0.2 0.0 - 5.0 %    Lymphocytes % 27.8 20.0 - 42.0 %    Monocytes % 9.5 2.0 - 12.0 %    Eosinophils % 1.6 0.0 - 6.0 %    Basophils % 0.5 0.0 - 2.0 %    Neutrophils Absolute 5.02 1.80 - 7.30 E9/L    Immature Granulocytes # 0.02 E9/L    Lymphocytes Absolute 2.31 1.50 - 4.00 E9/L    Monocytes Absolute 0.79 0.10 - 0.95 E9/L    Eosinophils Absolute 0.13 0.05 - 0.50 E9/L    Basophils Absolute 0.04 0.00 - 0.20 E9/L   Comprehensive Metabolic Panel w/ Reflex to MG   Result Value Ref Range    Sodium 139 132 - 146 mmol/L    Potassium reflex Magnesium 3.7 3.5 - 5.0 mmol/L    Chloride 102 98 - 107 mmol/L    CO2 26 22 - 29 mmol/L    Anion Gap 11 7 - 16 mmol/L    Glucose 134 (H) 74 - 99 mg/dL    BUN 10 6 - 23 mg/dL    CREATININE 0.8 0.5 - 1.0 mg/dL    GFR Non-African American >60 >=60 mL/min/1.73    GFR African American >60     Calcium 9.7 8.6 - 10.2 mg/dL    Total Protein 8.9 (H) 6.4 - 8.3 g/dL    Albumin 4.1 3.5 - 5.2 g/dL    Total Bilirubin 0.3 0.0 - 1.2 mg/dL    Alkaline Phosphatase 89 35 - 104 U/L    ALT 12 0 - 32 U/L    AST 15 0 - 31 U/L   Lipase   Result Value Ref Range    Lipase 29 13 - 60 U/L   Urinalysis, reflex to microscopic   Result Value Ref Range    Color, UA Yellow Straw/Yellow    Clarity, UA Clear Clear    Glucose, Ur Negative Negative mg/dL    Bilirubin Urine Negative Negative    Ketones, Urine Negative Negative mg/dL    Specific Gravity, UA 1.015 1.005 - 1.030    Blood, Urine TRACE (A) Negative    pH, UA 6.0 5.0 - 9.0    Protein, UA Negative Negative mg/dL    Urobilinogen, Urine 0.2 <2.0 E.U./dL    Nitrite, Urine Negative Negative    Leukocyte Esterase, Urine SMALL (A) Negative   Microscopic Urinalysis   Result Value Ref Range    WBC, UA 1-3 0 - 5 /HPF    RBC, UA 0-1 0 - 2 /HPF    Epithelial Cells, UA FEW /HPF    Bacteria, UA FEW (A) None Seen /HPF       RADIOLOGY:  Interpreted by Radiologist.  CT ABDOMEN PELVIS W IV CONTRAST Additional Contrast? None   Final Result   At least partially obstructing mass or masses throughout the rectosigmoid   colon. There is distended stool-filled large bowel proximal to this lesion. Retroperitoneal masses/lymphadenopathy, concerning for metastatic disease. Recommend correlation with PET-CT if not previously performed. ------------------------- NURSING NOTES AND VITALS REVIEWED ---------------------------   The nursing notes within the ED encounter and vital signs as below have been reviewed. BP (!) 196/89   Pulse 80   Temp 97.7 °F (36.5 °C) (Temporal)   Resp 18   SpO2 95%   Oxygen Saturation Interpretation: Normal      ---------------------------------------------------PHYSICAL EXAM--------------------------------------      Constitutional/General: Alert and oriented x3, well appearing, non toxic in NAD  Head: Normocephalic and atraumatic  Eyes: PERRL, EOMI  Mouth: Oropharynx clear, handling secretions, no trismus  Neck: Supple, full ROM,   Pulmonary: Lungs clear to auscultation bilaterally, no wheezes, rales, or rhonchi. Not in respiratory distress  Cardiovascular:  Regular rate and rhythm, no murmurs, gallops, or rubs.  2+ distal pulses  Abdomen: Soft, mild generalized abdominal tenderness to palpation, no rebound, no masses or guarding, non distended,   Extremities: Moves all extremities x 4. Warm and well perfused  Skin: warm and dry without rash  Neurologic: GCS 15,  Psych: Normal Affect      ------------------------------ ED COURSE/MEDICAL DECISION MAKING----------------------  Medications   iopamidol (ISOVUE-370) 76 % injection 75 mL (75 mLs IntraVENous Given 9/28/21 0284)         ED COURSE:       Medical Decision Making:    Patient is a 54-year-old female presenting to the emergency department with abdominal pain. Lab work is fairly unremarkable however, patient CT scan did find masses concerning for possible metastatic disease. Discussed this with the patient and her spouse. Patient did have some narrowing in the retrosigmoid area of her colon on her most recent colonoscopy several months ago with Dr. Laura Millan. Patient was planning on following up with a surgeon for further evaluation of this. I did advise that these masses could potentially be causing the narrowing at this part of her colon and she really does need to follow-up very closely with surgery for further investigation of these lesions. Also recommended follow-up with PCP for recheck. At this time we will plan for outpatient symptom management and follow-up as previously discussed. Patient and her spouse voiced understanding and are agreeable to the above treatment plan. Counseling: The emergency provider has spoken with the patient and spouse/SO and discussed todays results, in addition to providing specific details for the plan of care and counseling regarding the diagnosis and prognosis. Questions are answered at this time and they are agreeable with the plan.      --------------------------------- IMPRESSION AND DISPOSITION ---------------------------------    IMPRESSION  1.  Abdominal mass, unspecified abdominal location        DISPOSITION  Disposition: Discharge to home  Patient condition is stable      NOTE: This report was transcribed using voice recognition software.  Every effort was made to ensure accuracy; however, inadvertent computerized transcription errors may be present        Jia [facility]  09/28/21 1567

## 2021-09-28 NOTE — ED TRIAGE NOTES
FIRST PROVIDER CONTACT ASSESSMENT NOTE      Department of Emergency Medicine   Admit Date: No admission date for patient encounter. Chief Complaint: Abdominal Pain (Pt presents today for upper abd pain. pt states that it has been there for years but has gotten worse of the last several days. C/o Nausea. )      History of Present Illness:   Carlos Wilson is a 57 y.o. female who presents to the ED for upper abdominal pain left worse than right. Ongoing x years but worse the past 3 days.  Nausea but no vomiting.         -----------------END OF FIRST PROVIDER CONTACT ASSESSMENT NOTE--------------  Electronically signed by ALANIS Navarro   DD: 9/28/21

## 2021-10-05 ENCOUNTER — INITIAL CONSULT (OUTPATIENT)
Dept: SURGERY | Age: 61
End: 2021-10-05
Payer: MEDICAID

## 2021-10-05 VITALS
RESPIRATION RATE: 18 BRPM | SYSTOLIC BLOOD PRESSURE: 143 MMHG | HEIGHT: 66 IN | WEIGHT: 220 LBS | OXYGEN SATURATION: 98 % | BODY MASS INDEX: 35.36 KG/M2 | DIASTOLIC BLOOD PRESSURE: 86 MMHG | HEART RATE: 78 BPM | TEMPERATURE: 97.6 F

## 2021-10-05 DIAGNOSIS — K63.89 COLONIC MASS: Primary | ICD-10-CM

## 2021-10-05 PROCEDURE — G8427 DOCREV CUR MEDS BY ELIG CLIN: HCPCS | Performed by: SURGERY

## 2021-10-05 PROCEDURE — 3017F COLORECTAL CA SCREEN DOC REV: CPT | Performed by: SURGERY

## 2021-10-05 PROCEDURE — G8417 CALC BMI ABV UP PARAM F/U: HCPCS | Performed by: SURGERY

## 2021-10-05 PROCEDURE — 99203 OFFICE O/P NEW LOW 30 MIN: CPT | Performed by: SURGERY

## 2021-10-05 PROCEDURE — 4004F PT TOBACCO SCREEN RCVD TLK: CPT | Performed by: SURGERY

## 2021-10-05 PROCEDURE — G8484 FLU IMMUNIZE NO ADMIN: HCPCS | Performed by: SURGERY

## 2021-10-05 NOTE — PROGRESS NOTES
General Surgery History and Physical  [facility]    Patient's Name/Date of Birth: John Cornejo / 1960    Date: October 5, 2021     Consulting Surgeon: Danyel Simmons MD    PCP: No primary care provider on file. Chief Complaint: Colonic mass    HPI:   John Cornejo is a 57 y.o. female who presents for evaluation of possible colon mass. She was recently seen in the emergency room with abdominal distention and nausea. She had a CT abdomen pelvis that revealed thickening in the rectosigmoid colon with inflammatory changes concerning for a malignancy. She denies any history of colon cancer. She has had no recent weight loss. She persists with nausea and inability to take much p.o. She had a last colonoscopy by GI 1 year ago and was told she had a diverticular stricture. She has follow-up with GI planned for tomorrow. She has a history of right renal cell cancer status post resection.     Patient Active Problem List   Diagnosis    Right renal mass    Renal cell carcinoma of right kidney (HCC)    Essential hypertension    Smoker    Anxiety    Anemia       Allergies   Allergen Reactions    Penicillins Hives     ITCHING    Tylenol [Acetaminophen] Itching    Nuts [Peanut-Containing Drug Products] Hives     ALL NUTS- PISTACHIOS       Past Medical History:   Diagnosis Date    Anxiety     Cancer ([address] 2016    Kidney    Irritable bowel syndrome     Obesity        Past Surgical History:   Procedure Laterality Date    KIDNEY BIOPSY      TONSILLECTOMY         Social History     Socioeconomic History    Marital status:      Spouse name: Not on file    Number of children: Not on file    Years of education: Not on file    Highest education level: Not on file   Occupational History    Not on file   Tobacco Use    Smoking status: Current Every Day Smoker     Packs/day: 1.00     Years: 40.00     Pack years: 40.00     Types: Cigarettes    Smokeless tobacco: Never Used    Tobacco comment: SHE IS TRYING   Substance and Sexual Activity    Alcohol use: No    Drug use: No    Sexual activity: Not on file   Other Topics Concern    Not on file   Social History Narrative    Not on file     Social Determinants of Health     Financial Resource Strain:     Difficulty of Paying Living Expenses:    Food Insecurity:     Worried About Running Out of Food in the Last Year:     [address] in the Last Year:    Transportation Needs:     Lack of Transportation (Medical):  Lack of Transportation (Non-Medical):    Physical Activity:     Days of Exercise per Week:     Minutes of Exercise per Session:    Stress:     Feeling of Stress :    Social Connections:     Frequency of Communication with Friends and Family:     Frequency of Social Gatherings with Friends and Family:     Attends Confucianist Services:     Active Member of Clubs or Organizations:     Attends Club or Organization Meetings:     Marital Status:    Intimate Partner Violence:     Fear of Current or Ex-Partner:     Emotionally Abused:     Physically Abused:     Sexually Abused: The patient has a family history that is negative for severe cardiovascular or respiratory issues, negative for reaction to anesthesia. No results for input(s): WBC, HGB, HCT, MCV, PLT in the last 72 hours. No results for input(s): NA, K, CO2, PHOS, BUN, CREATININE in the last 72 hours. Invalid input(s): CA    No results for input(s): PROT, INR, LIPASE, AMYLASE in the last 72 hours. No intake or output data in the 24 hours ending 10/05/21 1508    Review of Systems  A complete 10 system review was performed and are otherwise negative unless mentioned in the above HPI. Specific negatives are listed below but may not include all those reviewed.     General ROS: negative obtundation, AMS  ENT ROS: negative rhinorrhea, epistaxis  Allergy and Immunology ROS: negative itchy/watery eyes or nasal congestion  Hematological and Lymphatic ROS: negative spontaneous bleeding or bruising  Endocrine ROS: negative  lethargy, mood swings, palpitations or polydipsia/polyuria  Respiratory ROS: negative sputum changes, stridor, tachypnea or wheezing  Cardiovascular ROS: negative for - loss of consciousness, murmur or orthopnea  Gastrointestinal ROS: negative for - hematochezia or hematemesis  Genito-Urinary ROS: negative for -  genital discharge or hematuria  Musculoskeletal ROS: negative for - focal weakness, gangrene  Psych/Neuro ROS: negative for - visual or auditory hallucinations, suicidal ideation    Physical exam:   BP (!) 143/86 (Site: Right Upper Arm, Position: Sitting, Cuff Size: Medium Adult)   Pulse 78   Temp 97.6 °F (36.4 °C) (Temporal)   Resp 18   Ht 5' 6\" (1.676 m)   Wt 220 lb (99.8 kg)   SpO2 98%   BMI 35.51 kg/m²   General appearance:  NAD, appears stated age  Head: NCAT, PERRLA, EOMI, red conjunctiva  Neck: supple, no masses, trachea midline  Lungs: Equal chest rise bilateral, no retractions, no wheezing  Heart: Reg rate  Abdomen: soft, mildly distended. Non tender  Skin; warm and dry, no cyanosis  Gu: no cva tenderness  Extremities: atraumatic, no focal motor deficits, no open wounds  Psych: No tremor, visual hallucinations    Radiology: I reviewed relevant abdominal imaging from this admission and that available in the EMR including CT abd/pel from 9/28/21.  My assessment is sigmoid colon mass with retroperitoneal lymphadenopathy      Assessment:  Mario Calles is a 57 y.o. female with sigmoid colon mass, chronic diverticulitis with stricture versus colonic adenocarcinoma    Patient Active Problem List   Diagnosis    Right renal mass    Renal cell carcinoma of right kidney ([facility] [address]    Essential hypertension    Smoker    Anxiety    Anemia       Plan:  Check CEA  Patient to follow-up with Dr. Jesse Barrett tomorrow for possible repeat colonoscopy  Follow-up after GI evaluation  Due to impending large bowel obstruction, will need OR for laparoscopic possible robotic sigmoid colectomy poss ostomy, possible open    Discussed the risk, benefits and alternatives of surgery including wound infections, bleeding, scar and hernia formation and the risks of general anesthetic including MI, CVA, sudden death or reactions to anesthetic medications. The patient understands the risks and alternatives and the possibility of converting to an open procedure. All questions were answered to the patient's satisfaction and they freely signed the consent. Time spent reviewing past medical, surgical, social and family history, vitals, nursing assessment and images. Time spent face to face with patient and family counciling and discussing care exceeded 50% of the time of the consult. Additional time spent reviewing images and labs, discussing case with nursing, support staff and other physicians; as well as coordinating care.         Physician Signature: Electronically signed by Dr. Marichuy Wright MD

## 2021-12-17 ENCOUNTER — HOSPITAL ENCOUNTER (OUTPATIENT)
Dept: CT IMAGING | Age: 61
Discharge: HOME | End: 2021-12-17
Payer: MEDICAID

## 2021-12-17 ENCOUNTER — HOSPITAL ENCOUNTER (OUTPATIENT)
Age: 61
Discharge: HOME | End: 2021-12-17
Payer: MEDICAID

## 2021-12-17 DIAGNOSIS — R10.13 EPIGASTRIC PAIN: ICD-10-CM

## 2021-12-17 LAB
BUN BLDV-MCNC: 9 MG/DL (ref 6–23)
CREAT SERPL-MCNC: 0.7 MG/DL (ref 0.5–1)
GFR AFRICAN AMERICAN: >60
GFR NON-AFRICAN AMERICAN: >60 ML/MIN/1.73

## 2021-12-17 PROCEDURE — 6360000004 HC RX CONTRAST MEDICATION: Performed by: RADIOLOGY

## 2021-12-17 PROCEDURE — 74177 CT ABD & PELVIS W/CONTRAST: CPT

## 2021-12-17 PROCEDURE — 84520 ASSAY OF UREA NITROGEN: CPT

## 2021-12-17 PROCEDURE — 36415 COLL VENOUS BLD VENIPUNCTURE: CPT

## 2021-12-17 PROCEDURE — 82565 ASSAY OF CREATININE: CPT

## 2021-12-17 RX ADMIN — IOPAMIDOL 75 ML: 755 INJECTION, SOLUTION INTRAVENOUS at 13:28

## 2021-12-17 RX ADMIN — IOHEXOL 50 ML: 240 INJECTION, SOLUTION INTRATHECAL; INTRAVASCULAR; INTRAVENOUS; ORAL at 13:27

## 2021-12-30 ENCOUNTER — TELEPHONE (OUTPATIENT)
Dept: SURGERY | Age: 61
End: 2021-12-30

## 2021-12-30 NOTE — TELEPHONE ENCOUNTER
Call placed to schedule follow up in office to review Colonoscopy done by Dr. Radha Saeed. Patient reported that she does not want to schedule at this time. Patient is to follow up with Dr. Radha Saeed on [phone]. Patient would like to follow up with Dr. Radha Saeed before making any appointments. Patient encouraged to please contact our office when she is ready to proceed.   Electronically signed by Aleksandar Atkins on 12/30/21 at 12:21 PM EST

## 2022-01-17 ENCOUNTER — HOSPITAL ENCOUNTER (OUTPATIENT)
Dept: CT IMAGING | Age: 62
Discharge: HOME | End: 2022-01-17
Payer: MEDICAID

## 2022-01-17 DIAGNOSIS — C18.7 MALIGNANT NEOPLASM OF SIGMOID COLON (HCC): ICD-10-CM

## 2022-01-17 PROCEDURE — 71260 CT THORAX DX C+: CPT

## 2022-01-17 PROCEDURE — 6360000004 HC RX CONTRAST MEDICATION: Performed by: RADIOLOGY

## 2022-01-17 RX ADMIN — IOPAMIDOL 75 ML: 755 INJECTION, SOLUTION INTRAVENOUS at 13:47

## 2022-02-01 ENCOUNTER — TELEPHONE (OUTPATIENT)
Dept: SURGERY | Age: 62
End: 2022-02-01

## 2022-02-01 ENCOUNTER — OFFICE VISIT (OUTPATIENT)
Dept: SURGERY | Age: 62
End: 2022-02-01
Payer: MEDICAID

## 2022-02-01 VITALS
BODY MASS INDEX: 29.89 KG/M2 | DIASTOLIC BLOOD PRESSURE: 80 MMHG | HEART RATE: 94 BPM | SYSTOLIC BLOOD PRESSURE: 159 MMHG | HEIGHT: 66 IN | WEIGHT: 186 LBS | TEMPERATURE: 98.1 F

## 2022-02-01 DIAGNOSIS — K63.89 MASS OF COLON: Primary | ICD-10-CM

## 2022-02-01 PROCEDURE — 4004F PT TOBACCO SCREEN RCVD TLK: CPT | Performed by: SURGERY

## 2022-02-01 PROCEDURE — 3017F COLORECTAL CA SCREEN DOC REV: CPT | Performed by: SURGERY

## 2022-02-01 PROCEDURE — 99213 OFFICE O/P EST LOW 20 MIN: CPT | Performed by: SURGERY

## 2022-02-01 PROCEDURE — G8417 CALC BMI ABV UP PARAM F/U: HCPCS | Performed by: SURGERY

## 2022-02-01 PROCEDURE — G8427 DOCREV CUR MEDS BY ELIG CLIN: HCPCS | Performed by: SURGERY

## 2022-02-01 PROCEDURE — G8484 FLU IMMUNIZE NO ADMIN: HCPCS | Performed by: SURGERY

## 2022-02-01 NOTE — TELEPHONE ENCOUNTER
Prior Authorization Form:      DEMOGRAPHICS:                     Patient Name:  Nico Ordonez  Patient :  1960            Insurance:  Payor: [facility] / Plan: [employer] / Product Type: *No Product type* /   Insurance ID Number:    Payor/Plan Subscr  Sex Relation Sub.  Ins. ID Effective Group Num   [address]. [address]* 1960 Female Self [id number] 1/1/15 [id number]                                   [address]         DIAGNOSIS & PROCEDURE:                       Procedure/Operation: laparoscopic robotic sigmoid colectomy           CPT Code: 30102    Diagnosis:  Sigmoid colon mass    ICD10 Code: K63.89    Location:  [facility]    Surgeon:  Phillip Barron INFORMATION:                          Date: 2022    Time: TBD              Anesthesia:  General                                                       Status:  Inpatient        Special Comments:         Electronically signed by Geraldine Earl on 2022 at 10:32 AM

## 2022-02-01 NOTE — TELEPHONE ENCOUNTER
Per the order of Dr. Linda Ibrahim, patient has been scheduled for laparoscopic robotic sigmoid colectomy on 2.14.22. Patient provided with procedure information during office visit and scheduled for post op follow up appointnetn. Patient instructed to please contact our office with any questions. Mag Citrate and Byrne prep instructions provided during office visit. Surgery scheduling form faxed to [facility] surgery scheduling and fax confirmation received. Dr. Linda Ibrahim to enter orders.     Electronically signed by Anish Arora on 2/1/22 at 10:32 AM EST

## 2022-02-02 RX ORDER — ERYTHROMYCIN 500 MG/1
TABLET, COATED ORAL
Qty: 6 TABLET | Refills: 0 | Status: ON HOLD
Start: 2022-02-02 | End: 2022-02-14 | Stop reason: ALTCHOICE

## 2022-02-02 RX ORDER — NEOMYCIN SULFATE 500 MG/1
1000 TABLET ORAL SEE ADMIN INSTRUCTIONS
Qty: 6 TABLET | Refills: 0 | Status: SHIPPED | OUTPATIENT
Start: 2022-02-02 | End: 2022-02-03

## 2022-02-02 NOTE — PROGRESS NOTES
General Surgery History and Physical  [facility]    Patient's Name/Date of Birth: Jami Meza / 1960    Date: February 2, 2022     Consulting Surgeon: Rika German MD    PCP: No primary care provider on file. Chief Complaint: Colonic mass    HPI:   Jami Meza is a 57 y.o. female who presents for evaluation of possible colon mass. She was recently seen in the emergency room with abdominal distention and nausea. She had a CT abdomen pelvis that revealed thickening in the rectosigmoid colon with inflammatory changes concerning for a malignancy. She denies any history of colon cancer. She has had no recent weight loss. She persists with nausea and inability to take much p.o. She had a last colonoscopy by GI after ED visit and was found to have a diverticular stricture with bx negative for neoplasm. She has a history of right renal cell cancer status post resection.     Patient Active Problem List   Diagnosis    Right renal mass    Renal cell carcinoma of right kidney (HCC)    Essential hypertension    Smoker    Anxiety    Anemia       Allergies   Allergen Reactions    Penicillins Hives     ITCHING    Tylenol [Acetaminophen] Itching    Nuts [Peanut-Containing Drug Products] Hives     ALL NUTS- PISTACHIOS       Past Medical History:   Diagnosis Date    Anxiety     Cancer ([facility] [address] 2016    Kidney    Irritable bowel syndrome     Obesity        Past Surgical History:   Procedure Laterality Date    KIDNEY BIOPSY      TONSILLECTOMY         Social History     Socioeconomic History    Marital status:      Spouse name: Not on file    Number of children: Not on file    Years of education: Not on file    Highest education level: Not on file   Occupational History    Not on file   Tobacco Use    Smoking status: Current Every Day Smoker     Packs/day: 1.00     Years: 40.00     Pack years: 40.00     Types: Cigarettes    Smokeless tobacco: Never Used   [facility] Tobacco comment: SHE IS TRYING   Substance and Sexual Activity    Alcohol use: No    Drug use: No    Sexual activity: Not on file   Other Topics Concern    Not on file   Social History Narrative    Not on file     Social Determinants of Health     Financial Resource Strain:     Difficulty of Paying Living Expenses: Not on file   Food Insecurity:     Worried About Running Out of Food in the Last Year: Not on file    Clyde of Food in the Last Year: Not on file   Transportation Needs:     Lack of Transportation (Medical): Not on file    Lack of Transportation (Non-Medical): Not on file   Physical Activity:     Days of Exercise per Week: Not on file    Minutes of Exercise per Session: Not on file   Stress:     Feeling of Stress : Not on file   Social Connections:     Frequency of Communication with Friends and Family: Not on file    Frequency of Social Gatherings with Friends and Family: Not on file    Attends Denominational Services: Not on file    Active Member of [address] [place] [address] or Organizations: Not on file    Attends Club or Organization Meetings: Not on file    Marital Status: Not on file   Intimate Partner Violence:     Fear of Current or Ex-Partner: Not on file    Emotionally Abused: Not on file    Physically Abused: Not on file    Sexually Abused: Not on file   Housing Stability:     Unable to Pay for Housing in the Last Year: Not on file    Number of [place] in the Last Year: Not on file    Unstable Housing in the Last Year: Not on file       The patient has a family history that is negative for severe cardiovascular or respiratory issues, negative for reaction to anesthesia. No results for input(s): WBC, HGB, HCT, MCV, PLT in the last 72 hours. No results for input(s): NA, K, CO2, PHOS, BUN, CREATININE, CA in the last 72 hours. No results for input(s): PROT, INR, LIPASE, AMYLASE in the last 72 hours.     No intake or output data in the 24 hours ending 02/02/22 0501    Review of Systems  A complete 10 system review was performed and are otherwise negative unless mentioned in the above HPI. Specific negatives are listed below but may not include all those reviewed. General ROS: negative obtundation, AMS  ENT ROS: negative rhinorrhea, epistaxis  Allergy and Immunology ROS: negative itchy/watery eyes or nasal congestion  Hematological and Lymphatic ROS: negative spontaneous bleeding or bruising  Endocrine ROS: negative  lethargy, mood swings, palpitations or polydipsia/polyuria  Respiratory ROS: negative sputum changes, stridor, tachypnea or wheezing  Cardiovascular ROS: negative for - loss of consciousness, murmur or orthopnea  Gastrointestinal ROS: negative for - hematochezia or hematemesis  Genito-Urinary ROS: negative for -  genital discharge or hematuria  Musculoskeletal ROS: negative for - focal weakness, gangrene  Psych/Neuro ROS: negative for - visual or auditory hallucinations, suicidal ideation    Physical exam:   BP (!) 159/80   Pulse 94   Temp 98.1 °F (36.7 °C) (Temporal)   Ht 5' 6\" (1.676 m)   Wt 186 lb (84.4 kg)   BMI 30.02 kg/m²   General appearance:  NAD, appears stated age  Head: NCAT, PERRLA, EOMI, red conjunctiva  Neck: supple, no masses, trachea midline  Lungs: Equal chest rise bilateral, no retractions, no wheezing  Heart: Reg rate  Abdomen: soft, mildly distended. Non tender  Skin; warm and dry, no cyanosis  Gu: no cva tenderness  Extremities: atraumatic, no focal motor deficits, no open wounds  Psych: No tremor, visual hallucinations    Radiology: I reviewed relevant abdominal imaging from this admission and that available in the EMR including CT abd/pel from 9/28/21.  My assessment is sigmoid colon mass with retroperitoneal lymphadenopathy      Assessment:  Autumn Colvin is a 57 y.o. female with sigmoid colon mass, chronic diverticulitis with stricture versus colonic adenocarcinoma    Patient Active Problem List   Diagnosis    Right renal mass    Renal cell carcinoma of right kidney ([facility] [address]    Essential hypertension    Smoker    Anxiety    Anemia       Plan:    Due to malignant appearing mass, and impending large bowel obstruction, will proceed to OR for laparoscopic possible robotic sigmoid colectomy poss ostomy, possible open    Discussed the risk, benefits and alternatives of surgery including wound infections, bleeding, scar and hernia formation and the risks of general anesthetic including MI, CVA, sudden death or reactions to anesthetic medications. The patient understands the risks and alternatives and the possibility of converting to an open procedure. All questions were answered to the patient's satisfaction and they freely signed the consent. Time spent reviewing past medical, surgical, social and family history, vitals, nursing assessment and images. Time spent face to face with patient and family counciling and discussing care exceeded 50% of the time of the consult. Additional time spent reviewing images and labs, discussing case with nursing, support staff and other physicians; as well as coordinating care.         Physician Signature: Electronically signed by Dr. Tor Sweeney MD

## 2022-02-07 ENCOUNTER — HOSPITAL ENCOUNTER (OUTPATIENT)
Dept: PREADMISSION TESTING | Age: 62
Discharge: HOME | End: 2022-02-07
Payer: MEDICAID

## 2022-02-07 VITALS
SYSTOLIC BLOOD PRESSURE: 175 MMHG | TEMPERATURE: 97.8 F | RESPIRATION RATE: 16 BRPM | BODY MASS INDEX: 29.89 KG/M2 | WEIGHT: 186 LBS | DIASTOLIC BLOOD PRESSURE: 80 MMHG | HEART RATE: 84 BPM | OXYGEN SATURATION: 97 % | HEIGHT: 66 IN

## 2022-02-07 DIAGNOSIS — Z01.818 PREOP TESTING: Primary | ICD-10-CM

## 2022-02-07 LAB
ANION GAP SERPL CALCULATED.3IONS-SCNC: 11 MMOL/L (ref 7–16)
BUN BLDV-MCNC: 9 MG/DL (ref 6–23)
CALCIUM SERPL-MCNC: 9.6 MG/DL (ref 8.6–10.2)
CHLORIDE BLD-SCNC: 101 MMOL/L (ref 98–107)
CO2: 27 MMOL/L (ref 22–29)
CREAT SERPL-MCNC: 0.7 MG/DL (ref 0.5–1)
GFR AFRICAN AMERICAN: >60
GFR NON-AFRICAN AMERICAN: >60 ML/MIN/1.73
GLUCOSE BLD-MCNC: 93 MG/DL (ref 74–99)
HCT VFR BLD CALC: 38.9 % (ref 34–48)
HEMOGLOBIN: 12.7 G/DL (ref 11.5–15.5)
MAGNESIUM: 1.7 MG/DL (ref 1.6–2.6)
MCH RBC QN AUTO: 29.3 PG (ref 26–35)
MCHC RBC AUTO-ENTMCNC: 32.6 % (ref 32–34.5)
MCV RBC AUTO: 89.6 FL (ref 80–99.9)
PDW BLD-RTO: 14.6 FL (ref 11.5–15)
PLATELET # BLD: 277 E9/L (ref 130–450)
PMV BLD AUTO: 9.7 FL (ref 7–12)
POTASSIUM REFLEX MAGNESIUM: 3 MMOL/L (ref 3.5–5)
RBC # BLD: 4.34 E12/L (ref 3.5–5.5)
SODIUM BLD-SCNC: 139 MMOL/L (ref 132–146)
WBC # BLD: 7 E9/L (ref 4.5–11.5)

## 2022-02-07 PROCEDURE — 80048 BASIC METABOLIC PNL TOTAL CA: CPT

## 2022-02-07 PROCEDURE — 83735 ASSAY OF MAGNESIUM: CPT

## 2022-02-07 PROCEDURE — 87081 CULTURE SCREEN ONLY: CPT

## 2022-02-07 PROCEDURE — 93005 ELECTROCARDIOGRAM TRACING: CPT | Performed by: ANESTHESIOLOGY

## 2022-02-07 PROCEDURE — 85027 COMPLETE CBC AUTOMATED: CPT

## 2022-02-07 PROCEDURE — 36415 COLL VENOUS BLD VENIPUNCTURE: CPT

## 2022-02-07 ASSESSMENT — PAIN DESCRIPTION - PAIN TYPE: TYPE: CHRONIC PAIN

## 2022-02-07 ASSESSMENT — PAIN SCALES - GENERAL: PAINLEVEL_OUTOF10: 8

## 2022-02-07 ASSESSMENT — PAIN DESCRIPTION - DESCRIPTORS: DESCRIPTORS: ACHING;SORE;NAGGING

## 2022-02-07 ASSESSMENT — PAIN DESCRIPTION - LOCATION: LOCATION: ABDOMEN

## 2022-02-07 NOTE — PROGRESS NOTES
[address]        Date: 2/7/2022    Date of surgery: 2/14/22   Arrival Time: Hospital will call you between 5pm and 7pm with your final arrival time for surgery    1. Do not eat or drink anything after midnight prior to surgery. This includes no water, chewing gum, mints or ice chips. 2. Take the following medications with a small sip of water on the morning of Surgery: none     3. Diabetics may take evening dose of insulin but none after midnight. If you feel symptomatic or low blood sugar morning of surgery drink 1-2 ounces of apple juice only. 4. Aspirin, Ibuprofen, Advil, Naproxen, Vitamin E and other Anti-inflammatory products should be stopped  before surgery  as directed by your physician. Take Tylenol only unless instructed otherwise by your surgeon. 5. Check with your Doctor regarding stopping Plavix, Coumadin, Lovenox, Eliquis, Effient, or other blood thinners. 6. Do not smoke,use illicit drugs and do not drink any alcoholic beverages 24 hours prior to surgery. 7. You may brush your teeth the morning of surgery. DO NOT SWALLOW WATER    8. You MUST make arrangements for a responsible adult to take you home after your surgery. You will not be allowed to leave alone or drive yourself home. It is strongly suggested someone stay with you the first 24 hrs. Your surgery will be cancelled if you do not have a ride home. 9. PEDIATRIC PATIENTS ONLY:  A parent/legal guardian must accompany a child scheduled for surgery and plan to stay at the hospital until the child is discharged. Please do not bring other children with you.     10. Please wear simple, loose fitting clothing to the hospital.  Jaycob Moreno not bring valuables (money, credit cards, checkbooks, etc.) Do not wear any makeup (including no eye makeup) or nail

## 2022-02-08 LAB — MRSA CULTURE ONLY: NORMAL

## 2022-02-08 NOTE — PROGRESS NOTES
Case reviewed with Dr. Yaima Navarro re: pt stopped going to PCP approx 2 years ago and ran out of prescriptions, is not taking any meds for BP. Also reviewed potassium in BMP. He wants me to advise pt to eat potassium rich foods and that surgery might be cancelled if her BP is high, and also that post op could be hampered due to health not being managed by a PCP. I left a voicemail for pt.

## 2022-02-09 ENCOUNTER — HOSPITAL ENCOUNTER (OUTPATIENT)
Age: 62
Discharge: HOME | End: 2022-02-11
Payer: MEDICAID

## 2022-02-09 DIAGNOSIS — Z01.818 PREOP TESTING: ICD-10-CM

## 2022-02-09 LAB
EKG ATRIAL RATE: 83 BPM
EKG P AXIS: 72 DEGREES
EKG P-R INTERVAL: 138 MS
EKG Q-T INTERVAL: 390 MS
EKG QRS DURATION: 114 MS
EKG QTC CALCULATION (BAZETT): 458 MS
EKG R AXIS: -62 DEGREES
EKG T AXIS: 63 DEGREES
EKG VENTRICULAR RATE: 83 BPM

## 2022-02-09 PROCEDURE — U0005 INFEC AGEN DETEC AMPLI PROBE: HCPCS

## 2022-02-09 PROCEDURE — U0003 INFECTIOUS AGENT DETECTION BY NUCLEIC ACID (DNA OR RNA); SEVERE ACUTE RESPIRATORY SYNDROME CORONAVIRUS 2 (SARS-COV-2) (CORONAVIRUS DISEASE [COVID-19]), AMPLIFIED PROBE TECHNIQUE, MAKING USE OF HIGH THROUGHPUT TECHNOLOGIES AS DESCRIBED BY CMS-2020-01-R: HCPCS

## 2022-02-10 LAB — SARS-COV-2, PCR: NOT DETECTED

## 2022-02-14 ENCOUNTER — HOSPITAL ENCOUNTER (INPATIENT)
Age: 62
LOS: 3 days | Discharge: HOME HEALTH | DRG: 231 | End: 2022-02-17
Attending: SURGERY | Admitting: SURGERY
Payer: MEDICAID

## 2022-02-14 ENCOUNTER — ANESTHESIA (OUTPATIENT)
Dept: OPERATING ROOM | Age: 62
DRG: 231 | End: 2022-02-14
Payer: MEDICAID

## 2022-02-14 ENCOUNTER — ANESTHESIA EVENT (OUTPATIENT)
Dept: OPERATING ROOM | Age: 62
DRG: 231 | End: 2022-02-14
Payer: MEDICAID

## 2022-02-14 ENCOUNTER — TELEPHONE (OUTPATIENT)
Dept: FAMILY MEDICINE CLINIC | Age: 62
End: 2022-02-14

## 2022-02-14 DIAGNOSIS — Z01.818 PREOP TESTING: Primary | ICD-10-CM

## 2022-02-14 DIAGNOSIS — K63.89 MASS OF COLON: ICD-10-CM

## 2022-02-14 LAB
ANION GAP SERPL CALCULATED.3IONS-SCNC: 13 MMOL/L (ref 7–16)
BUN BLDV-MCNC: 13 MG/DL (ref 6–23)
CALCIUM SERPL-MCNC: 9.2 MG/DL (ref 8.6–10.2)
CHLORIDE BLD-SCNC: 96 MMOL/L (ref 98–107)
CO2: 29 MMOL/L (ref 22–29)
CREAT SERPL-MCNC: 0.8 MG/DL (ref 0.5–1)
GFR AFRICAN AMERICAN: >60
GFR NON-AFRICAN AMERICAN: >60 ML/MIN/1.73
GLUCOSE BLD-MCNC: 80 MG/DL (ref 74–99)
MAGNESIUM: 2.4 MG/DL (ref 1.6–2.6)
POTASSIUM SERPL-SCNC: 2.5 MMOL/L (ref 3.5–5)
POTASSIUM SERPL-SCNC: 2.8 MMOL/L (ref 3.5–5)
POTASSIUM SERPL-SCNC: 3 MMOL/L (ref 3.5–5)
SODIUM BLD-SCNC: 138 MMOL/L (ref 132–146)

## 2022-02-14 PROCEDURE — 94640 AIRWAY INHALATION TREATMENT: CPT

## 2022-02-14 PROCEDURE — 6360000002 HC RX W HCPCS: Performed by: NURSE PRACTITIONER

## 2022-02-14 PROCEDURE — 6360000002 HC RX W HCPCS: Performed by: STUDENT IN AN ORGANIZED HEALTH CARE EDUCATION/TRAINING PROGRAM

## 2022-02-14 PROCEDURE — 0DTN0ZZ RESECTION OF SIGMOID COLON, OPEN APPROACH: ICD-10-PCS | Performed by: SURGERY

## 2022-02-14 PROCEDURE — 83735 ASSAY OF MAGNESIUM: CPT

## 2022-02-14 PROCEDURE — 6370000000 HC RX 637 (ALT 250 FOR IP): Performed by: STUDENT IN AN ORGANIZED HEALTH CARE EDUCATION/TRAINING PROGRAM

## 2022-02-14 PROCEDURE — 36415 COLL VENOUS BLD VENIPUNCTURE: CPT

## 2022-02-14 PROCEDURE — 84132 ASSAY OF SERUM POTASSIUM: CPT

## 2022-02-14 PROCEDURE — 6360000002 HC RX W HCPCS: Performed by: SURGERY

## 2022-02-14 PROCEDURE — 80048 BASIC METABOLIC PNL TOTAL CA: CPT

## 2022-02-14 PROCEDURE — 1200000000 HC SEMI PRIVATE

## 2022-02-14 PROCEDURE — 0DJD4ZZ INSPECTION OF LOWER INTESTINAL TRACT, PERCUTANEOUS ENDOSCOPIC APPROACH: ICD-10-PCS | Performed by: SURGERY

## 2022-02-14 PROCEDURE — 0D1M0Z4 BYPASS DESCENDING COLON TO CUTANEOUS, OPEN APPROACH: ICD-10-PCS | Performed by: SURGERY

## 2022-02-14 RX ORDER — POTASSIUM CHLORIDE 7.45 MG/ML
10 INJECTION INTRAVENOUS
Status: COMPLETED | OUTPATIENT
Start: 2022-02-14 | End: 2022-02-14

## 2022-02-14 RX ORDER — BUDESONIDE 0.5 MG/2ML
0.5 INHALANT ORAL 2 TIMES DAILY
Status: DISCONTINUED | OUTPATIENT
Start: 2022-02-14 | End: 2022-02-17 | Stop reason: HOSPADM

## 2022-02-14 RX ORDER — ONDANSETRON 4 MG/1
4 TABLET, ORALLY DISINTEGRATING ORAL 3 TIMES DAILY PRN
Status: DISCONTINUED | OUTPATIENT
Start: 2022-02-14 | End: 2022-02-14 | Stop reason: HOSPADM

## 2022-02-14 RX ORDER — SODIUM CHLORIDE 9 MG/ML
INJECTION, SOLUTION INTRAVENOUS CONTINUOUS
Status: DISCONTINUED | OUTPATIENT
Start: 2022-02-14 | End: 2022-02-14

## 2022-02-14 RX ORDER — OXYCODONE HYDROCHLORIDE 5 MG/1
10 TABLET ORAL EVERY 4 HOURS PRN
Status: DISCONTINUED | OUTPATIENT
Start: 2022-02-14 | End: 2022-02-17 | Stop reason: HOSPADM

## 2022-02-14 RX ORDER — SODIUM CHLORIDE 0.9 % (FLUSH) 0.9 %
5-40 SYRINGE (ML) INJECTION EVERY 12 HOURS SCHEDULED
Status: DISCONTINUED | OUTPATIENT
Start: 2022-02-14 | End: 2022-02-17 | Stop reason: HOSPADM

## 2022-02-14 RX ORDER — MORPHINE SULFATE 2 MG/ML
2 INJECTION, SOLUTION INTRAMUSCULAR; INTRAVENOUS
Status: DISCONTINUED | OUTPATIENT
Start: 2022-02-14 | End: 2022-02-17 | Stop reason: HOSPADM

## 2022-02-14 RX ORDER — ALBUTEROL SULFATE 2.5 MG/3ML
2.5 SOLUTION RESPIRATORY (INHALATION) EVERY 4 HOURS PRN
Status: DISCONTINUED | OUTPATIENT
Start: 2022-02-14 | End: 2022-02-17 | Stop reason: HOSPADM

## 2022-02-14 RX ORDER — POTASSIUM CHLORIDE 20 MEQ/1
40 TABLET, EXTENDED RELEASE ORAL ONCE
Status: DISCONTINUED | OUTPATIENT
Start: 2022-02-14 | End: 2022-02-14 | Stop reason: SDUPTHER

## 2022-02-14 RX ORDER — ARFORMOTEROL TARTRATE 15 UG/2ML
15 SOLUTION RESPIRATORY (INHALATION) 2 TIMES DAILY
Status: DISCONTINUED | OUTPATIENT
Start: 2022-02-14 | End: 2022-02-17 | Stop reason: HOSPADM

## 2022-02-14 RX ORDER — INDOCYANINE GREEN AND WATER 25 MG
5 KIT INJECTION
Status: DISPENSED | OUTPATIENT
Start: 2022-02-14 | End: 2022-02-14

## 2022-02-14 RX ORDER — SODIUM CHLORIDE 0.9 % (FLUSH) 0.9 %
5-40 SYRINGE (ML) INJECTION PRN
Status: DISCONTINUED | OUTPATIENT
Start: 2022-02-14 | End: 2022-02-14

## 2022-02-14 RX ORDER — SODIUM CHLORIDE 9 MG/ML
25 INJECTION, SOLUTION INTRAVENOUS PRN
Status: DISCONTINUED | OUTPATIENT
Start: 2022-02-14 | End: 2022-02-17 | Stop reason: HOSPADM

## 2022-02-14 RX ORDER — ONDANSETRON 2 MG/ML
4 INJECTION INTRAMUSCULAR; INTRAVENOUS EVERY 6 HOURS PRN
Status: DISCONTINUED | OUTPATIENT
Start: 2022-02-14 | End: 2022-02-17 | Stop reason: HOSPADM

## 2022-02-14 RX ORDER — CEFAZOLIN SODIUM 2 G/50ML
2000 SOLUTION INTRAVENOUS
Status: DISCONTINUED | OUTPATIENT
Start: 2022-02-14 | End: 2022-02-14

## 2022-02-14 RX ORDER — SODIUM CHLORIDE 0.9 % (FLUSH) 0.9 %
5-40 SYRINGE (ML) INJECTION PRN
Status: DISCONTINUED | OUTPATIENT
Start: 2022-02-14 | End: 2022-02-17 | Stop reason: HOSPADM

## 2022-02-14 RX ORDER — DICYCLOMINE HYDROCHLORIDE 10 MG/1
10 CAPSULE ORAL 4 TIMES DAILY
Status: DISCONTINUED | OUTPATIENT
Start: 2022-02-14 | End: 2022-02-14

## 2022-02-14 RX ORDER — CARVEDILOL 6.25 MG/1
6.25 TABLET ORAL 2 TIMES DAILY WITH MEALS
Status: DISCONTINUED | OUTPATIENT
Start: 2022-02-14 | End: 2022-02-14

## 2022-02-14 RX ORDER — POTASSIUM CHLORIDE AND SODIUM CHLORIDE 900; 300 MG/100ML; MG/100ML
INJECTION, SOLUTION INTRAVENOUS CONTINUOUS
Status: DISCONTINUED | OUTPATIENT
Start: 2022-02-14 | End: 2022-02-17

## 2022-02-14 RX ORDER — PANTOPRAZOLE SODIUM 40 MG/1
40 TABLET, DELAYED RELEASE ORAL
Status: DISCONTINUED | OUTPATIENT
Start: 2022-02-15 | End: 2022-02-17 | Stop reason: HOSPADM

## 2022-02-14 RX ORDER — MORPHINE SULFATE 4 MG/ML
4 INJECTION, SOLUTION INTRAMUSCULAR; INTRAVENOUS
Status: DISCONTINUED | OUTPATIENT
Start: 2022-02-14 | End: 2022-02-17 | Stop reason: HOSPADM

## 2022-02-14 RX ORDER — TRIAMTERENE AND HYDROCHLOROTHIAZIDE 37.5; 25 MG/1; MG/1
1 TABLET ORAL DAILY
Status: CANCELLED | OUTPATIENT
Start: 2022-02-14

## 2022-02-14 RX ORDER — SODIUM CHLORIDE 9 MG/ML
25 INJECTION, SOLUTION INTRAVENOUS PRN
Status: DISCONTINUED | OUTPATIENT
Start: 2022-02-14 | End: 2022-02-14

## 2022-02-14 RX ORDER — OXYCODONE HYDROCHLORIDE 5 MG/1
5 TABLET ORAL EVERY 4 HOURS PRN
Status: DISCONTINUED | OUTPATIENT
Start: 2022-02-14 | End: 2022-02-17 | Stop reason: HOSPADM

## 2022-02-14 RX ORDER — SODIUM CHLORIDE, SODIUM LACTATE, POTASSIUM CHLORIDE, CALCIUM CHLORIDE 600; 310; 30; 20 MG/100ML; MG/100ML; MG/100ML; MG/100ML
INJECTION, SOLUTION INTRAVENOUS CONTINUOUS
Status: DISCONTINUED | OUTPATIENT
Start: 2022-02-14 | End: 2022-02-14

## 2022-02-14 RX ORDER — SODIUM CHLORIDE 0.9 % (FLUSH) 0.9 %
5-40 SYRINGE (ML) INJECTION EVERY 12 HOURS SCHEDULED
Status: DISCONTINUED | OUTPATIENT
Start: 2022-02-14 | End: 2022-02-14

## 2022-02-14 RX ORDER — ONDANSETRON 4 MG/1
4 TABLET, ORALLY DISINTEGRATING ORAL EVERY 8 HOURS PRN
Status: DISCONTINUED | OUTPATIENT
Start: 2022-02-14 | End: 2022-02-17 | Stop reason: HOSPADM

## 2022-02-14 RX ADMIN — BUDESONIDE 500 MCG: 0.5 INHALANT RESPIRATORY (INHALATION) at 17:59

## 2022-02-14 RX ADMIN — POTASSIUM CHLORIDE AND SODIUM CHLORIDE: 900; 300 INJECTION, SOLUTION INTRAVENOUS at 23:35

## 2022-02-14 RX ADMIN — ENOXAPARIN SODIUM 40 MG: 100 INJECTION SUBCUTANEOUS at 13:36

## 2022-02-14 RX ADMIN — ARFORMOTEROL TARTRATE 15 MCG: 15 SOLUTION RESPIRATORY (INHALATION) at 17:59

## 2022-02-14 RX ADMIN — POTASSIUM CHLORIDE 10 MEQ: 7.46 INJECTION, SOLUTION INTRAVENOUS at 15:02

## 2022-02-14 RX ADMIN — POTASSIUM CHLORIDE 10 MEQ: 7.46 INJECTION, SOLUTION INTRAVENOUS at 15:50

## 2022-02-14 RX ADMIN — POTASSIUM CHLORIDE AND SODIUM CHLORIDE: 900; 300 INJECTION, SOLUTION INTRAVENOUS at 13:36

## 2022-02-14 RX ADMIN — POTASSIUM BICARBONATE 40 MEQ: 782 TABLET, EFFERVESCENT ORAL at 13:33

## 2022-02-14 RX ADMIN — POTASSIUM CHLORIDE 10 MEQ: 7.46 INJECTION, SOLUTION INTRAVENOUS at 13:36

## 2022-02-14 RX ADMIN — POTASSIUM CHLORIDE 10 MEQ: 7.46 INJECTION, SOLUTION INTRAVENOUS at 16:46

## 2022-02-14 ASSESSMENT — PAIN - FUNCTIONAL ASSESSMENT: PAIN_FUNCTIONAL_ASSESSMENT: 0-10

## 2022-02-14 ASSESSMENT — PAIN SCALES - GENERAL
PAINLEVEL_OUTOF10: 0

## 2022-02-14 ASSESSMENT — LIFESTYLE VARIABLES: SMOKING_STATUS: 1

## 2022-02-14 NOTE — OP NOTE
the peritoneum medially and dissected that down distally past the uterus down to the lower rectum. I then proceeded to dissect proximally and continued the rent in the mesentery until I could identify the ureter. I then started my dissection laterally. The left colon was mobilized all the way up to and around the splenic flexure. Down in the left pelvic inlet and pelvic brim, I was unable to separate the colon from the pelvic brim. AN I could not identify the ureter on the lateral side though I could see it from the medial dissection. At this point, I will also was not able to see the distal rectum where I had dissected out from the medial side. I decided to convert to an open procedure. A midline laparotomy was made starting from just above the umbilicus down to the level of the pubis. Dissection was carried out with the electrocautery through the skin, subcutaneous tissue, down to the fascia which was incised. The abdominal cavity was examined. Again desmoplastic versus inflammatory process was seen in the sigmoid colon which was very adhered to the left lateral sidewall of the pelvis. A combination of finger fracture and electrocautery was used to separate the colon from the pelvic brim in this area. I was then able to carry this dissection distally until the mid rectum was able to be isolated. A contour stapler was then used to fire across the mid rectum and divide the colon. The LigaSure device was then used to dissect up the sigmoid colon down to the level of the distal descending colon about 5 cm proximal to where the large inflammatory/neoplastic mass was seen in the sigmoid colon. It was very evident at this point that the proximal colon was very thickened and dilated with a huge size mismatch between that and the distal rectum. A end-to-end anastomosis could not be done safely at this time. Decision was made to bring out a colostomy.     A disc of skin was removed from the left lateral abdomen just below the umbilicus. Dissection was carried down through subcutaneous tissue down to the level of the anterior rectus sheath which was incised in a cruciate fashion. The rectus muscle was split and the posterior rectus sheath was incised. Due to the very thick colon which I was unable to divide with a MARY stapler, electrocautery was then used to divide the distal descending colon again 5 cm proximal to the mass. Once the colon was divided, the specimen was passed off. I was then able to bring out this divided colon through the stoma created in the left lateral abdomen. The abdomen was copiously irrigated with warm saline until the aspirate returned clear. I then inspected the abdomen for signs of neoplasm or metastasis. There was no lesions in the liver. In the retroperitoneum, anterior to the IVC, there was a large mass which was seen on prior CT scan. This was however in the retroperitoneum without any easily accessible portion to biopsy so I decided not to biopsy this. I then proceeded to close the fascia using a 0 looped PDS suture starting from the umbilicus down and from the pubis up and tying those in the middle. The subcutaneous space was then copiously irrigated and the skin was closed with skin staples. The right lower quadrant 10 mm trocar site had been closed with a 0 Vicryl suture prior to converting to an open procedure. The other skin incisions were closed with skin stapler. The skin was cleaned and dried and all the incisions were covered. We then proceeded to mature the left lower quadrant colostomy using 3-0 Vicryl suture circumferentially. The colon was noted to be patent past the fascia without any kinks or twist in the colon. There was copious liquid stool draining throughout this portion of the procedure. Once the colon was adequately matured to the skin, a colostomy appliance was placed. The patient tolerated the procedure well.   There were no complications. All instruments, needle, sponge counts were correct at the end of the procedure. She has extubated and transferred to the postanesthesia care unit in stable condition.       Rika German MD

## 2022-02-14 NOTE — TELEPHONE ENCOUNTER
----- Message from Landen Yen sent at 2/12/2022  9:17 AM EST -----  Subject: Message to Provider    QUESTIONS  Information for Provider? pt. had a covid test pre op covid test done on   Feb. 9th. she needs to know her results for her surgery on Feb. 14th. Please call pt. and advise.  ---------------------------------------------------------------------------  --------------  CALL BACK INFO  What is the best way for the office to contact you? OK to leave message on   voicemail  Preferred Call Back Phone Number? [phone]  ---------------------------------------------------------------------------  --------------  SCRIPT ANSWERS  Relationship to Patient?  Self

## 2022-02-14 NOTE — ANESTHESIA PRE PROCEDURE
Department of Anesthesiology  Preprocedure Note       Name:  Cassandra Padilla   Age:  57 y.o.  :  1960                                          MRN:  [id number]         Date:  2022      Surgeon: Mane Trujillo):  Gordy Prescott MD    Procedure: Procedure(s):  LAPAROSCOPIC SIGMOID COLECTOMY    Medications prior to admission:   Prior to Admission medications    Medication Sig Start Date End Date Taking?  Authorizing Provider   erythromycin base (E-MYCIN) 500 MG tablet Take 2 tabs at 1 pm, 2 pm and 10 pm the day before surgery 22   Gordy Prescott MD   dicyclomine (BENTYL) 10 MG capsule Take 1 capsule by mouth 4 times daily  Patient not taking: Reported on 2022   ALANIS Caldwell   ondansetron (ZOFRAN-ODT) 4 MG disintegrating tablet Take 1 tablet by mouth 3 times daily as needed for Nausea or Vomiting  Patient not taking: Reported on 2022   ALANIS Caldwell   triamterene-hydrochlorothiazide [facility]) 37.5-25 MG per tablet Take 1 tablet by mouth daily  Patient not taking: Reported on 10/5/2021 2/27/20 10/5/21  Gideon Dean MD   carvedilol (COREG) 6.25 MG tablet Take 1 tablet by mouth 2 times daily (with meals)  Patient not taking: Reported on 10/5/2021 2/27/20 5/27/20  Gideon Dean MD   omeprazole (PRILOSEC) 20 MG delayed release capsule TAKE 1 CAPSULE BY MOUTH ONCE DAILY BEFORE BREAKFAST 19   Historical Provider, MD       Current medications:    Current Facility-Administered Medications   Medication Dose Route Frequency Provider Last Rate Last Admin    sodium chloride flush 0.9 % injection 5-40 mL  5-40 mL IntraVENous 2 times per day Gordy Prescott MD        sodium chloride flush 0.9 % injection 5-40 mL  5-40 mL IntraVENous PRN Gordy Prescott MD        0.9 % sodium chloride infusion  25 mL IntraVENous PRN Gordy Prescott MD        ceFAZolin (ANCEF) 2000 mg in dextrose 3 % 50 mL IVPB (duplex)  2,000 mg IntraVENous On Call to Sloan Gee MD  metronidazole (FLAGYL) 500 mg in NaCl 100 mL IVPB premix  500 mg IntraVENous On Call to Nguyen Erwin MD        indocyanine green (IC-GREEN) syringe 5 mg  5 mg IntraVENous On Call to Nguyen Erwin MD        lactated ringers infusion   IntraVENous Continuous Dimple Mark MD           Allergies: Allergies   Allergen Reactions    Penicillins Hives     ITCHING    Tylenol [Acetaminophen] Itching    Nuts [Peanut-Containing Drug Products] Hives     ALL NUTS- PISTACHIOS       Problem List:    Patient Active Problem List   Diagnosis Code    Right renal mass N28.89    Renal cell carcinoma of right kidney (HCC) C64.1    Essential hypertension I10    Smoker F17.200    Anxiety F41.9    Anemia D64.9    Mass of colon K63.89       Past Medical History:        Diagnosis Date    Anxiety     Cancer ([facility] [address] 2016    Kidney    Irritable bowel syndrome     Obesity        Past Surgical History:        Procedure Laterality Date    KIDNEY BIOPSY Right     st e's mass removed    TONSILLECTOMY         Social History:    Social History     Tobacco Use    Smoking status: Current Every Day Smoker     Packs/day: 1.00     Years: 40.00     Pack years: 40.00     Types: Cigarettes    Smokeless tobacco: Never Used    Tobacco comment: SHE IS TRYING   Substance Use Topics    Alcohol use:  No                                Ready to quit: Not Answered  Counseling given: Not Answered  Comment: SHE IS TRYING      Vital Signs (Current):   Vitals:    02/14/22 1008   BP: (!) 150/79   Pulse: 90   Resp: 16   Temp: 98.1 °F (36.7 °C)   TempSrc: Infrared   SpO2: 94%                                              BP Readings from Last 3 Encounters:   02/14/22 (!) 150/79   02/07/22 (!) 175/80   02/01/22 (!) 159/80       NPO Status: Time of last liquid consumption: 2200                        Time of last solid consumption: 2200                        Date of last liquid consumption: 02/13/22                        Date of last solid food consumption: 02/11/22    BMI:   Wt Readings from Last 3 Encounters:   02/07/22 186 lb (84.4 kg)   02/01/22 186 lb (84.4 kg)   10/05/21 220 lb (99.8 kg)     There is no height or weight on file to calculate BMI.    CBC:   Lab Results   Component Value Date    WBC 7.0 02/07/2022    RBC 4.34 02/07/2022    HGB 12.7 02/07/2022    HCT 38.9 02/07/2022    MCV 89.6 02/07/2022    RDW 14.6 02/07/2022     02/07/2022       CMP:   Lab Results   Component Value Date     02/07/2022    K 3.0 02/07/2022     02/07/2022    CO2 27 02/07/2022    BUN 9 02/07/2022    CREATININE 0.7 02/07/2022    GFRAA >60 02/07/2022    LABGLOM >60 02/07/2022    GLUCOSE 93 02/07/2022    GLUCOSE 117 02/24/2012    PROT 8.9 09/28/2021    CALCIUM 9.6 02/07/2022    BILITOT 0.3 09/28/2021    ALKPHOS 89 09/28/2021    AST 15 09/28/2021    ALT 12 09/28/2021       POC Tests: No results for input(s): POCGLU, POCNA, POCK, POCCL, POCBUN, POCHEMO, POCHCT in the last 72 hours. Coags: No results found for: PROTIME, INR, APTT    HCG (If Applicable): No results found for: PREGTESTUR, PREGSERUM, HCG, HCGQUANT     ABGs: No results found for: PHART, PO2ART, TXX4JFC, OWX1ZXH, BEART, K2HIKXZW     Type & Screen (If Applicable):  No results found for: LABABO, LABRH    Drug/Infectious Status (If Applicable):  No results found for: HIV, HEPCAB    COVID-19 Screening (If Applicable):   Lab Results   Component Value Date    COVID19 Not Detected 02/09/2022           Anesthesia Evaluation  Patient summary reviewed no history of anesthetic complications:   Airway: Mallampati: II  TM distance: >3 FB   Neck ROM: full  Mouth opening: > = 3 FB Dental:      Comment: EXTREMELY LOOSE BOTTOM FRONT TOOTH  MISSING TEETH WITH DECAY    Pulmonary: breath sounds clear to auscultation  (+) current smoker          Patient smoked on day of surgery.                  Cardiovascular:    (+) hypertension:,         Rhythm: regular             Beta Blocker:  Not on Beta Blocker         Neuro/Psych:   (+) depression/anxiety             GI/Hepatic/Renal:   (+) GERD:, renal disease:,          ROS comment: SIGMOID COLON MASS. Endo/Other:    (+) malignancy/cancer (RENAL). Abdominal:             Vascular: Other Findings:             Anesthesia Plan      general     ASA 2     (PATIENT POTASSIUM NEEDS TO BE OPTIMIZED BEFORE HAVING A GENERAL ANESTHETIC. WILL TALK TO SURGEON)  Induction: intravenous. MIPS: Postoperative opioids intended and Prophylactic antiemetics administered. Anesthetic plan and risks discussed with patient. Plan discussed with CRNA.           [address] DO   2/14/2022

## 2022-02-14 NOTE — PLAN OF CARE
Problem: Nausea/Vomiting:  Goal: Absence of nausea/vomiting  Description: Absence of nausea/vomiting  Outcome: Met This Shift  Goal: Able to drink  Description: Able to drink  Outcome: Met This Shift  Goal: Able to eat  Description: Able to eat  Outcome: Met This Shift  Goal: Ability to achieve adequate nutritional intake will improve  Description: Ability to achieve adequate nutritional intake will improve  Outcome: Met This Shift

## 2022-02-14 NOTE — CONSULTS
Department of Internal Medicine  Internal Medicine Consultation Note    Primary Care Physician: No primary care provider on file. Admitting Physician:  Huyen Lopez MD  Admission date and time: 2/14/2022  9:32 AM    Room:  OR POOL/NONE  Admitting diagnosis: Mass of colon [K63.89]    Patient Name: Aravind Renteria  MRN: [id number]    Date of Service: 2/14/2022     Reason for consultation:  Medical management    HISTORY OF PRESENT ILLNESS:    Shu Cho is a 54-year-old female patient who presented today for planned surgical intervention by Dr. Lisandra Galloway. She had evaluation and endoscopy by Dr. Gia Patrick with biopsy negative for malignancy however given the appearance, CT scan with IV/oral contrast was performed which did show a concerning appearance and potentially metastatic retroperitoneal adenopathy. She was referred to the [facility] and chest CT with contrast was obtained as well given her smoking history with pulmonary nodule and emphysema identified but no other additional abnormal findings. General surgery consultation was obtained with recommendations for sigmoid colectomy with definitive tissue diagnosis. She was at her baseline state of health prior to the procedure and is seen and examined with her  present. He voices no acute complaints. She has not followed with a primary care provider in approximately 2 years and takes no routine medications. She is a smoker 1/2 pack/day of cigarettes since she was 11years old. She has never had low-dose lung CT screening routinely but did have a contrasted chest CT as discussed. He is delinquent on breast cancer surveillance with mammography. She is now up-to-date on colonoscopy. She is agreeable with establishing primary care locally and we will set up follow-up appointment for this purpose with our office following this hospitalization. She denies any fevers, chills, sick contacts or exposures. No documented COVID-19 vaccination status. Denies any unintentional weight loss, night sweats. Denies any associated fever or chills. Denies chest pains, shortness of breath, cough with phlegm or hemoptysis. Denies abdominal pains, nausea, vomiting, change in bowel patterns, hematochezia or melena. Denies dysuria, hematuria, suprapubic or flank pains. PAST MEDICAL Hx:  Past Medical History:   Diagnosis Date    Anxiety     Cancer [facility]) 2016    Kidney    Irritable bowel syndrome     Obesity        PAST SURGICAL Hx:   Past Surgical History:   Procedure Laterality Date    KIDNEY BIOPSY Right     st e's mass removed    TONSILLECTOMY         FAMILY Hx:  Family History   Problem Relation Age of Onset    Arthritis Mother     High Blood Pressure Mother     Other Father     Alcohol Abuse Brother     Heart Disease Brother     Heart Attack Brother     Cancer Maternal Aunt     Obesity Maternal Aunt     Alcohol Abuse Maternal Uncle     Cancer Maternal Uncle     Obesity Maternal Uncle     Obesity Paternal Aunt     Alcohol Abuse Paternal Uncle     Obesity Paternal Uncle     Arthritis Maternal Grandmother     Diabetes Maternal Grandmother     Heart Attack Maternal Grandmother     Arthritis Maternal Grandfather     Diabetes Maternal Grandfather     Diabetes Paternal Grandfather        HOME MEDICATIONS:  Prior to Admission medications    Medication Sig Start Date End Date Taking?  Authorizing Provider   erythromycin base (E-MYCIN) 500 MG tablet Take 2 tabs at 1 pm, 2 pm and 10 pm the day before surgery 2/2/22   Maryam An MD   dicyclomine (BENTYL) 10 MG capsule Take 1 capsule by mouth 4 times daily  Patient not taking: Reported on 2/1/2022 9/28/21   ALANIS Blanco   ondansetron (ZOFRAN-ODT) 4 MG disintegrating tablet Take 1 tablet by mouth 3 times daily as needed for Nausea or Vomiting  Patient not taking: Reported on 2/1/2022 9/28/21   ALANIS Blanco   triamterene-hydrochlorothiazide (MAXZIDE-25) 37.5-25 MG per tablet Take 1 tablet by mouth daily  Patient not taking: Reported on 10/5/2021 2/27/20 10/5/21  Shayan Sheth MD   carvedilol (COREG) 6.25 MG tablet Take 1 tablet by mouth 2 times daily (with meals)  Patient not taking: Reported on 10/5/2021 2/27/20 5/27/20  Shayan Sheth MD   omeprazole (PRILOSEC) 20 MG delayed release capsule TAKE 1 CAPSULE BY MOUTH ONCE DAILY BEFORE BREAKFAST 11/14/19   Historical Provider, MD       ALLERGIES:  Penicillins, Tylenol [acetaminophen], and Nuts [peanut-containing drug products]    SOCIAL Hx:  Social History     Socioeconomic History    Marital status:      Spouse name: Not on file    Number of children: Not on file    Years of education: Not on file    Highest education level: Not on file   Occupational History    Not on file   Tobacco Use    Smoking status: Current Every Day Smoker     Packs/day: 1.00     Years: 40.00     Pack years: 40.00     Types: Cigarettes    Smokeless tobacco: Never Used    Tobacco comment: SHE IS TRYING   Substance and Sexual Activity    Alcohol use: No    Drug use: No    Sexual activity: Not on file   Other Topics Concern    Not on file   Social History Narrative    Not on file     Social Determinants of Health     Financial Resource Strain:     Difficulty of Paying Living Expenses: Not on file   Food Insecurity:     Worried About [employer] [employer] in the Last Year: Not on file    [place] [employer] in the Last Year: Not on file   Transportation Needs:     Lack of Transportation (Medical): Not on file    Lack of Transportation (Non-Medical):  Not on file   Physical Activity:     Days of Exercise per Week: Not on file    Minutes of Exercise per Session: Not on file   Stress:     Feeling of Stress : Not on file   Social Connections:     Frequency of Communication with Friends and Family: Not on file    Frequency of Social Gatherings with Friends and Family: Not on file    Attends Jain Services: Not on file   [employer] or Organizations: Not on file    Attends Club or Organization Meetings: Not on file    Marital Status: Not on file   Intimate Partner Violence:     Fear of Current or Ex-Partner: Not on file    Emotionally Abused: Not on file    Physically Abused: Not on file    Sexually Abused: Not on file   Housing Stability:     Unable to Pay for Housing in the Last Year: Not on file    Number of [place] in the Last Year: Not on file    Unstable Housing in the Last Year: Not on file       ROS: 12 point review of symptoms was conducted, pertinent positives and negative were reviewed, aside from that all 12 systems were reviewed and negative. PHYSICAL EXAM:  VITALS:  Vitals:    02/14/22 1008   BP: (!) 150/79   Pulse: 90   Resp: 16   Temp: 98.1 °F (36.7 °C)   SpO2: 94%         CONSTITUTIONAL:    Awake, alert, cooperative, comfortable, and appears stated age    EYES:    PERRL, EOMI, sclera clear without icterus, conjunctiva normal    ENT:    Normocephalic, atraumatic, sinuses nontender on palpation. External ears without lesions. Oral pharynx with moist mucus membranes. Mildly diminished    NECK:    Supple, symmetrical, trachea midline, no adenopathy, thyroid symmetric, not enlarged and no tenderness, skin normal, no bruits, no JVD    HEMATOLOGIC/LYMPHATICS:    No cervical lymphadenopathy and no supraclavicular lymphadenopathy    LUNGS:    Symmetric. No increased work of breathing,  air exchange, clear to auscultation bilaterally, no wheezes, rhonchi, or rales,     CARDIOVASCULAR:    Normal apical impulse, regular rate and rhythm, normal S1 and S2, no murmur noted    ABDOMEN:    Essentially normal contour, normal bowel sounds, soft, non-distended, non-tender, no masses palpated, no hepatosplenomegaly, no rebound or guarding elicited on palpation     MUSCULOSKELETAL:    There is no redness, warmth, or swelling of the joints. Tone is normal.    NEUROLOGIC:    Awake, alert, oriented to name, place and time.   Cranial nerves II-XII are grossly intact. SKIN:    No bruising or bleeding. No redness, warmth, or swelling    EXTREMITIES:    Peripheral pulses present. No edema, cyanosis, or swelling. LABORATORY DATA:  CBC with Differential:    Lab Results   Component Value Date    WBC 7.0 02/07/2022    RBC 4.34 02/07/2022    HGB 12.7 02/07/2022    HCT 38.9 02/07/2022     02/07/2022    MCV 89.6 02/07/2022    MCH 29.3 02/07/2022    MCHC 32.6 02/07/2022    RDW 14.6 02/07/2022    SEGSPCT 57 03/25/2013    LYMPHOPCT 27.8 09/28/2021    MONOPCT 9.5 09/28/2021    BASOPCT 0.5 09/28/2021    MONOSABS 0.79 09/28/2021    LYMPHSABS 2.31 09/28/2021    EOSABS 0.13 09/28/2021    BASOSABS 0.04 09/28/2021     BMP:    Lab Results   Component Value Date     02/07/2022    K 2.8 02/14/2022    K 3.0 02/07/2022     02/07/2022    CO2 27 02/07/2022    BUN 9 02/07/2022    LABALBU 4.1 09/28/2021    LABALBU 4.4 02/24/2012    CREATININE 0.7 02/07/2022    CALCIUM 9.6 02/07/2022    GFRAA >60 02/07/2022    LABGLOM >60 02/07/2022    GLUCOSE 93 02/07/2022    GLUCOSE 117 02/24/2012       ASSESSMENT:  · Colon mass with malignant appearance and potentially metastatic adenopathy with planned sigmoid colectomy +/- Ostomy  · Hypokalemia  · COPD/emphysema with incidentally noted pulmonary nodule in the setting of longstanding tobacco abuse  · Essential hypertension  · Renal cell carcinoma post partial nephrectomy in 2016    PLAN:  Jeff Koehler will undergo surgery today by Dr. Hilary Whitman as discussed. Symptomatic and supportive care, activity and dietary advancement as per the surgery team following the procedure. Medications have been reconciled by the surgeon, I have removed diuretic therapy from the patient's list as she has not been on any routine in hypertensive medication for 2 years, and to avoid lowering her blood pressure too dramatically. We will continue with carvedilol.   Routine lab will be assessed including hemoglobin A1c, lipid panel, thyroid studies and will be addressed accordingly. We discussed smoking cessation. In the setting of emphysema and anesthesia administration, routine and as needed breathing treatments will be provided and we have discussed aggressive use of the incentive spirometer. Underlying co-morbidites will be addressed during hospitalization as well. Labs and vital signs will be monitored closely and addressed accordingly. We have reinforced the importance of primary care for routine medical surveillance and she will establish with our office following hospitalization. See additional orders for details.      MEHUL Fagan CNP  11:57 AM  2/14/2022    Electronically signed by MEHUL Fagan CNP on 2/14/22 at 11:57 AM EST

## 2022-02-14 NOTE — H&P
General Surgery History and Physical  [facility]    Patient's Name/Date of Birth: Sharon Trejo / 1960    Date: 2/14/22    Consulting Surgeon: Haja Rodriguez MD    PCP: No primary care provider on file. Chief Complaint: Colonic mass    HPI:   Sharon Trejo is a 57 y.o. female who presents for evaluation of possible colon mass. She was recently seen in the emergency room with abdominal distention and nausea. She had a CT abdomen pelvis that revealed thickening in the rectosigmoid colon with inflammatory changes concerning for a malignancy. She denies any history of colon cancer. She has had no recent weight loss. She persists with nausea and inability to take much p.o. She had a last colonoscopy by GI after ED visit and was found to have a diverticular stricture with bx negative for neoplasm. She has a history of right renal cell cancer status post resection.     Patient Active Problem List   Diagnosis    Right renal mass    Renal cell carcinoma of right kidney (HCC)    Essential hypertension    Smoker    Anxiety    Anemia       Allergies   Allergen Reactions    Penicillins Hives     ITCHING    Tylenol [Acetaminophen] Itching    Nuts [Peanut-Containing Drug Products] Hives     ALL NUTS- PISTACHIOS       Past Medical History:   Diagnosis Date    Anxiety     Cancer ([facility] [address] 2016    Kidney    Irritable bowel syndrome     Obesity        Past Surgical History:   Procedure Laterality Date    KIDNEY BIOPSY Right     st e's mass removed    TONSILLECTOMY         Social History     Socioeconomic History    Marital status:      Spouse name: Not on file    Number of children: Not on file    Years of education: Not on file    Highest education level: Not on file   Occupational History    Not on file   Tobacco Use    Smoking status: Current Every Day Smoker     Packs/day: 1.00     Years: 40.00     Pack years: 40.00     Types: Cigarettes    Smokeless tobacco: Never Used    Tobacco comment: SHE IS TRYING   Substance and Sexual Activity    Alcohol use: No    Drug use: No    Sexual activity: Not on file   Other Topics Concern    Not on file   Social History Narrative    Not on file     Social Determinants of Health     Financial Resource Strain:     Difficulty of Paying Living Expenses: Not on file   Food Insecurity:     Worried About Running Out of Food in the Last Year: Not on file    [place] of Food in the Last Year: Not on file   Transportation Needs:     Lack of Transportation (Medical): Not on file    Lack of Transportation (Non-Medical): Not on file   Physical Activity:     Days of Exercise per Week: Not on file    Minutes of Exercise per Session: Not on file   Stress:     Feeling of Stress : Not on file   Social Connections:     Frequency of Communication with Friends and Family: Not on file    Frequency of Social Gatherings with Friends and Family: Not on file    Attends [employer] Services: Not on file    Active Member of [address] [place] [address] or Organizations: Not on file    Attends Club or Organization Meetings: Not on file    Marital Status: Not on file   Intimate Partner Violence:     Fear of Current or Ex-Partner: Not on file    Emotionally Abused: Not on file    Physically Abused: Not on file    Sexually Abused: Not on file   Housing Stability:     Unable to Pay for Housing in the Last Year: Not on file    Number of [place] in the Last Year: Not on file    Unstable Housing in the Last Year: Not on file       The patient has a family history that is negative for severe cardiovascular or respiratory issues, negative for reaction to anesthesia. No results for input(s): WBC, HGB, HCT, MCV, PLT in the last 72 hours. No results for input(s): NA, K, CO2, PHOS, BUN, CREATININE, CA in the last 72 hours. No results for input(s): PROT, INR, LIPASE, AMYLASE in the last 72 hours.     No intake or output data in the 24 hours ending 02/13/22 2118    Review of Systems  A complete 10 system review was performed and are otherwise negative unless mentioned in the above HPI. Specific negatives are listed below but may not include all those reviewed. General ROS: negative obtundation, AMS  ENT ROS: negative rhinorrhea, epistaxis  Allergy and Immunology ROS: negative itchy/watery eyes or nasal congestion  Hematological and Lymphatic ROS: negative spontaneous bleeding or bruising  Endocrine ROS: negative  lethargy, mood swings, palpitations or polydipsia/polyuria  Respiratory ROS: negative sputum changes, stridor, tachypnea or wheezing  Cardiovascular ROS: negative for - loss of consciousness, murmur or orthopnea  Gastrointestinal ROS: negative for - hematochezia or hematemesis  Genito-Urinary ROS: negative for -  genital discharge or hematuria  Musculoskeletal ROS: negative for - focal weakness, gangrene  Psych/Neuro ROS: negative for - visual or auditory hallucinations, suicidal ideation    Physical exam:   There were no vitals taken for this visit. General appearance:  NAD, appears stated age  Head: NCAT, PERRLA, EOMI, red conjunctiva  Neck: supple, no masses, trachea midline  Lungs: Equal chest rise bilateral, no retractions, no wheezing  Heart: Reg rate  Abdomen: soft, mildly distended. Non tender  Skin; warm and dry, no cyanosis  Gu: no cva tenderness  Extremities: atraumatic, no focal motor deficits, no open wounds  Psych: No tremor, visual hallucinations    Radiology: I reviewed relevant abdominal imaging from this admission and that available in the EMR including CT abd/pel from 9/28/21.  My assessment is sigmoid colon mass with retroperitoneal lymphadenopathy      Assessment:  Jose R Mckenna is a 57 y.o. female with sigmoid colon mass, chronic diverticulitis with stricture versus colonic adenocarcinoma    Patient Active Problem List   Diagnosis    Right renal mass    Renal cell carcinoma of right kidney ([facility] [address]    Essential hypertension    Smoker    Anxiety    Anemia       Plan:    Due to malignant appearing mass, and impending large bowel obstruction, will proceed to OR for laparoscopic possible robotic sigmoid colectomy poss ostomy, possible open    Discussed the risk, benefits and alternatives of surgery including wound infections, bleeding, scar and hernia formation and the risks of general anesthetic including MI, CVA, sudden death or reactions to anesthetic medications. The patient understands the risks and alternatives and the possibility of converting to an open procedure. All questions were answered to the patient's satisfaction and they freely signed the consent. Time spent reviewing past medical, surgical, social and family history, vitals, nursing assessment and images. Time spent face to face with patient and family counciling and discussing care exceeded 50% of the time of the consult. Additional time spent reviewing images and labs, discussing case with nursing, support staff and other physicians; as well as coordinating care.         Physician Signature: Electronically signed by Dr. Micheal Ashraf MD

## 2022-02-15 VITALS
DIASTOLIC BLOOD PRESSURE: 87 MMHG | TEMPERATURE: 94.8 F | OXYGEN SATURATION: 98 % | RESPIRATION RATE: 17 BRPM | SYSTOLIC BLOOD PRESSURE: 148 MMHG

## 2022-02-15 LAB
ANION GAP SERPL CALCULATED.3IONS-SCNC: 10 MMOL/L (ref 7–16)
BASOPHILS ABSOLUTE: 0.03 E9/L (ref 0–0.2)
BASOPHILS RELATIVE PERCENT: 0.6 % (ref 0–2)
BUN BLDV-MCNC: 10 MG/DL (ref 6–23)
CALCIUM SERPL-MCNC: 8.8 MG/DL (ref 8.6–10.2)
CHLORIDE BLD-SCNC: 99 MMOL/L (ref 98–107)
CHOLESTEROL, TOTAL: 132 MG/DL (ref 0–199)
CO2: 27 MMOL/L (ref 22–29)
CREAT SERPL-MCNC: 0.8 MG/DL (ref 0.5–1)
EOSINOPHILS ABSOLUTE: 0.11 E9/L (ref 0.05–0.5)
EOSINOPHILS RELATIVE PERCENT: 2.1 % (ref 0–6)
GFR AFRICAN AMERICAN: >60
GFR NON-AFRICAN AMERICAN: >60 ML/MIN/1.73
GLUCOSE BLD-MCNC: 82 MG/DL (ref 74–99)
HBA1C MFR BLD: 5.2 % (ref 4–5.6)
HCT VFR BLD CALC: 37.8 % (ref 34–48)
HDLC SERPL-MCNC: 37 MG/DL
HEMOGLOBIN: 12.3 G/DL (ref 11.5–15.5)
IMMATURE GRANULOCYTES #: 0.02 E9/L
IMMATURE GRANULOCYTES %: 0.4 % (ref 0–5)
LDL CHOLESTEROL CALCULATED: 68 MG/DL (ref 0–99)
LYMPHOCYTES ABSOLUTE: 1.9 E9/L (ref 1.5–4)
LYMPHOCYTES RELATIVE PERCENT: 35.9 % (ref 20–42)
MAGNESIUM: 2.4 MG/DL (ref 1.6–2.6)
MCH RBC QN AUTO: 28.9 PG (ref 26–35)
MCHC RBC AUTO-ENTMCNC: 32.5 % (ref 32–34.5)
MCV RBC AUTO: 88.7 FL (ref 80–99.9)
MONOCYTES ABSOLUTE: 0.63 E9/L (ref 0.1–0.95)
MONOCYTES RELATIVE PERCENT: 11.9 % (ref 2–12)
NEUTROPHILS ABSOLUTE: 2.6 E9/L (ref 1.8–7.3)
NEUTROPHILS RELATIVE PERCENT: 49.1 % (ref 43–80)
PDW BLD-RTO: 14.6 FL (ref 11.5–15)
PLATELET # BLD: 238 E9/L (ref 130–450)
PMV BLD AUTO: 9.7 FL (ref 7–12)
POTASSIUM REFLEX MAGNESIUM: 3.5 MMOL/L (ref 3.5–5)
RBC # BLD: 4.26 E12/L (ref 3.5–5.5)
SODIUM BLD-SCNC: 136 MMOL/L (ref 132–146)
T4 FREE: 1.46 NG/DL (ref 0.93–1.7)
TRIGL SERPL-MCNC: 136 MG/DL (ref 0–149)
TSH SERPL DL<=0.05 MIU/L-ACNC: 0.82 UIU/ML (ref 0.27–4.2)
VLDLC SERPL CALC-MCNC: 27 MG/DL
WBC # BLD: 5.3 E9/L (ref 4.5–11.5)

## 2022-02-15 PROCEDURE — 3600000014 HC SURGERY LEVEL 4 ADDTL 15MIN: Performed by: SURGERY

## 2022-02-15 PROCEDURE — 88342 IMHCHEM/IMCYTCHM 1ST ANTB: CPT

## 2022-02-15 PROCEDURE — 3700000001 HC ADD 15 MINUTES (ANESTHESIA): Performed by: SURGERY

## 2022-02-15 PROCEDURE — 7100000001 HC PACU RECOVERY - ADDTL 15 MIN: Performed by: SURGERY

## 2022-02-15 PROCEDURE — 6360000002 HC RX W HCPCS: Performed by: SURGERY

## 2022-02-15 PROCEDURE — 83735 ASSAY OF MAGNESIUM: CPT

## 2022-02-15 PROCEDURE — 84439 ASSAY OF FREE THYROXINE: CPT

## 2022-02-15 PROCEDURE — 85025 COMPLETE CBC W/AUTO DIFF WBC: CPT

## 2022-02-15 PROCEDURE — 6360000002 HC RX W HCPCS: Performed by: NURSE PRACTITIONER

## 2022-02-15 PROCEDURE — 3700000000 HC ANESTHESIA ATTENDED CARE: Performed by: SURGERY

## 2022-02-15 PROCEDURE — 94640 AIRWAY INHALATION TREATMENT: CPT

## 2022-02-15 PROCEDURE — 7100000000 HC PACU RECOVERY - FIRST 15 MIN: Performed by: SURGERY

## 2022-02-15 PROCEDURE — 6360000002 HC RX W HCPCS

## 2022-02-15 PROCEDURE — 6370000000 HC RX 637 (ALT 250 FOR IP): Performed by: SURGERY

## 2022-02-15 PROCEDURE — 83036 HEMOGLOBIN GLYCOSYLATED A1C: CPT

## 2022-02-15 PROCEDURE — 1200000000 HC SEMI PRIVATE

## 2022-02-15 PROCEDURE — 3600000004 HC SURGERY LEVEL 4 BASE: Performed by: SURGERY

## 2022-02-15 PROCEDURE — 36415 COLL VENOUS BLD VENIPUNCTURE: CPT

## 2022-02-15 PROCEDURE — 2720000010 HC SURG SUPPLY STERILE: Performed by: SURGERY

## 2022-02-15 PROCEDURE — 6360000002 HC RX W HCPCS: Performed by: ANESTHESIOLOGIST ASSISTANT

## 2022-02-15 PROCEDURE — 80048 BASIC METABOLIC PNL TOTAL CA: CPT

## 2022-02-15 PROCEDURE — 2500000003 HC RX 250 WO HCPCS: Performed by: SURGERY

## 2022-02-15 PROCEDURE — 6370000000 HC RX 637 (ALT 250 FOR IP): Performed by: INTERNAL MEDICINE

## 2022-02-15 PROCEDURE — 2500000003 HC RX 250 WO HCPCS: Performed by: ANESTHESIOLOGIST ASSISTANT

## 2022-02-15 PROCEDURE — 2709999900 HC NON-CHARGEABLE SUPPLY: Performed by: SURGERY

## 2022-02-15 PROCEDURE — 44143 PARTIAL REMOVAL OF COLON: CPT | Performed by: SURGERY

## 2022-02-15 PROCEDURE — 88341 IMHCHEM/IMCYTCHM EA ADD ANTB: CPT

## 2022-02-15 PROCEDURE — 80061 LIPID PANEL: CPT

## 2022-02-15 PROCEDURE — 44139 MOBILIZATION OF COLON: CPT | Performed by: SURGERY

## 2022-02-15 PROCEDURE — 88307 TISSUE EXAM BY PATHOLOGIST: CPT

## 2022-02-15 PROCEDURE — 84443 ASSAY THYROID STIM HORMONE: CPT

## 2022-02-15 PROCEDURE — 2580000003 HC RX 258: Performed by: ANESTHESIOLOGIST ASSISTANT

## 2022-02-15 PROCEDURE — 2580000003 HC RX 258: Performed by: SURGERY

## 2022-02-15 RX ORDER — SODIUM CHLORIDE, SODIUM LACTATE, POTASSIUM CHLORIDE, CALCIUM CHLORIDE 600; 310; 30; 20 MG/100ML; MG/100ML; MG/100ML; MG/100ML
INJECTION, SOLUTION INTRAVENOUS CONTINUOUS PRN
Status: DISCONTINUED | OUTPATIENT
Start: 2022-02-15 | End: 2022-02-15 | Stop reason: SDUPTHER

## 2022-02-15 RX ORDER — MIDAZOLAM HYDROCHLORIDE 1 MG/ML
INJECTION INTRAMUSCULAR; INTRAVENOUS PRN
Status: DISCONTINUED | OUTPATIENT
Start: 2022-02-15 | End: 2022-02-15 | Stop reason: SDUPTHER

## 2022-02-15 RX ORDER — LABETALOL HYDROCHLORIDE 5 MG/ML
INJECTION, SOLUTION INTRAVENOUS PRN
Status: DISCONTINUED | OUTPATIENT
Start: 2022-02-15 | End: 2022-02-15 | Stop reason: SDUPTHER

## 2022-02-15 RX ORDER — FENTANYL CITRATE 50 UG/ML
INJECTION, SOLUTION INTRAMUSCULAR; INTRAVENOUS PRN
Status: DISCONTINUED | OUTPATIENT
Start: 2022-02-15 | End: 2022-02-15 | Stop reason: SDUPTHER

## 2022-02-15 RX ORDER — CEFAZOLIN SODIUM 2 G/50ML
SOLUTION INTRAVENOUS
Status: DISPENSED
Start: 2022-02-15 | End: 2022-02-15

## 2022-02-15 RX ORDER — DEXAMETHASONE SODIUM PHOSPHATE 10 MG/ML
INJECTION, SOLUTION INTRAMUSCULAR; INTRAVENOUS PRN
Status: DISCONTINUED | OUTPATIENT
Start: 2022-02-15 | End: 2022-02-15 | Stop reason: SDUPTHER

## 2022-02-15 RX ORDER — FENTANYL CITRATE 50 UG/ML
25 INJECTION, SOLUTION INTRAMUSCULAR; INTRAVENOUS EVERY 5 MIN PRN
Status: DISCONTINUED | OUTPATIENT
Start: 2022-02-15 | End: 2022-02-15 | Stop reason: HOSPADM

## 2022-02-15 RX ORDER — PROPOFOL 10 MG/ML
INJECTION, EMULSION INTRAVENOUS PRN
Status: DISCONTINUED | OUTPATIENT
Start: 2022-02-15 | End: 2022-02-15 | Stop reason: SDUPTHER

## 2022-02-15 RX ORDER — LIDOCAINE HYDROCHLORIDE 20 MG/ML
INJECTION, SOLUTION INFILTRATION; PERINEURAL PRN
Status: DISCONTINUED | OUTPATIENT
Start: 2022-02-15 | End: 2022-02-15 | Stop reason: SDUPTHER

## 2022-02-15 RX ORDER — ROCURONIUM BROMIDE 10 MG/ML
INJECTION, SOLUTION INTRAVENOUS PRN
Status: DISCONTINUED | OUTPATIENT
Start: 2022-02-15 | End: 2022-02-15 | Stop reason: SDUPTHER

## 2022-02-15 RX ORDER — CEFAZOLIN SODIUM 1 G/3ML
INJECTION, POWDER, FOR SOLUTION INTRAMUSCULAR; INTRAVENOUS PRN
Status: DISCONTINUED | OUTPATIENT
Start: 2022-02-15 | End: 2022-02-15 | Stop reason: SDUPTHER

## 2022-02-15 RX ORDER — BUPIVACAINE HYDROCHLORIDE AND EPINEPHRINE 2.5; 5 MG/ML; UG/ML
INJECTION, SOLUTION EPIDURAL; INFILTRATION; INTRACAUDAL; PERINEURAL PRN
Status: DISCONTINUED | OUTPATIENT
Start: 2022-02-15 | End: 2022-02-15 | Stop reason: ALTCHOICE

## 2022-02-15 RX ORDER — KETAMINE HYDROCHLORIDE 50 MG/ML
INJECTION, SOLUTION, CONCENTRATE INTRAMUSCULAR; INTRAVENOUS PRN
Status: DISCONTINUED | OUTPATIENT
Start: 2022-02-15 | End: 2022-02-15 | Stop reason: SDUPTHER

## 2022-02-15 RX ADMIN — FENTANYL CITRATE 100 MCG: 50 INJECTION, SOLUTION INTRAMUSCULAR; INTRAVENOUS at 12:36

## 2022-02-15 RX ADMIN — POTASSIUM CHLORIDE AND SODIUM CHLORIDE: 900; 300 INJECTION, SOLUTION INTRAVENOUS at 07:29

## 2022-02-15 RX ADMIN — ARFORMOTEROL TARTRATE 15 MCG: 15 SOLUTION RESPIRATORY (INHALATION) at 06:19

## 2022-02-15 RX ADMIN — FENTANYL CITRATE 100 MCG: 50 INJECTION, SOLUTION INTRAMUSCULAR; INTRAVENOUS at 12:14

## 2022-02-15 RX ADMIN — ROCURONIUM BROMIDE 20 MG: 10 INJECTION, SOLUTION INTRAVENOUS at 13:16

## 2022-02-15 RX ADMIN — FENTANYL CITRATE 50 MCG: 50 INJECTION, SOLUTION INTRAMUSCULAR; INTRAVENOUS at 12:29

## 2022-02-15 RX ADMIN — ROCURONIUM BROMIDE 10 MG: 10 INJECTION, SOLUTION INTRAVENOUS at 12:29

## 2022-02-15 RX ADMIN — SODIUM CHLORIDE: 9 INJECTION, SOLUTION INTRAVENOUS at 14:05

## 2022-02-15 RX ADMIN — ARFORMOTEROL TARTRATE 15 MCG: 15 SOLUTION RESPIRATORY (INHALATION) at 18:28

## 2022-02-15 RX ADMIN — BUDESONIDE 500 MCG: 0.5 INHALANT RESPIRATORY (INHALATION) at 06:20

## 2022-02-15 RX ADMIN — Medication 0.5 MG: at 15:07

## 2022-02-15 RX ADMIN — LIDOCAINE HYDROCHLORIDE 100 MG: 20 INJECTION, SOLUTION INFILTRATION; PERINEURAL at 12:05

## 2022-02-15 RX ADMIN — POTASSIUM BICARBONATE 40 MEQ: 782 TABLET, EFFERVESCENT ORAL at 07:28

## 2022-02-15 RX ADMIN — DEXAMETHASONE SODIUM PHOSPHATE 10 MG: 10 INJECTION, SOLUTION INTRAMUSCULAR; INTRAVENOUS at 12:06

## 2022-02-15 RX ADMIN — PROPOFOL 30 MG: 10 INJECTION, EMULSION INTRAVENOUS at 12:36

## 2022-02-15 RX ADMIN — BUDESONIDE 500 MCG: 0.5 INHALANT RESPIRATORY (INHALATION) at 18:28

## 2022-02-15 RX ADMIN — PANTOPRAZOLE SODIUM 40 MG: 40 TABLET, DELAYED RELEASE ORAL at 07:28

## 2022-02-15 RX ADMIN — FENTANYL CITRATE 50 MCG: 50 INJECTION, SOLUTION INTRAMUSCULAR; INTRAVENOUS at 14:25

## 2022-02-15 RX ADMIN — FENTANYL CITRATE 50 MCG: 50 INJECTION, SOLUTION INTRAMUSCULAR; INTRAVENOUS at 13:16

## 2022-02-15 RX ADMIN — SODIUM CHLORIDE, POTASSIUM CHLORIDE, SODIUM LACTATE AND CALCIUM CHLORIDE: 600; 310; 30; 20 INJECTION, SOLUTION INTRAVENOUS at 12:37

## 2022-02-15 RX ADMIN — SODIUM CHLORIDE: 9 INJECTION, SOLUTION INTRAVENOUS at 11:57

## 2022-02-15 RX ADMIN — Medication 0.5 MG: at 15:29

## 2022-02-15 RX ADMIN — FENTANYL CITRATE 100 MCG: 50 INJECTION, SOLUTION INTRAMUSCULAR; INTRAVENOUS at 12:05

## 2022-02-15 RX ADMIN — MORPHINE SULFATE 4 MG: 4 INJECTION, SOLUTION INTRAMUSCULAR; INTRAVENOUS at 16:09

## 2022-02-15 RX ADMIN — ONDANSETRON 4 MG: 4 TABLET, ORALLY DISINTEGRATING ORAL at 09:13

## 2022-02-15 RX ADMIN — KETAMINE HYDROCHLORIDE 20 MG: 50 INJECTION INTRAMUSCULAR; INTRAVENOUS at 13:20

## 2022-02-15 RX ADMIN — LABETALOL HYDROCHLORIDE 10 MG: 5 INJECTION INTRAVENOUS at 14:30

## 2022-02-15 RX ADMIN — PROPOFOL 150 MG: 10 INJECTION, EMULSION INTRAVENOUS at 12:05

## 2022-02-15 RX ADMIN — ROCURONIUM BROMIDE 40 MG: 10 INJECTION, SOLUTION INTRAVENOUS at 12:06

## 2022-02-15 RX ADMIN — CEFAZOLIN 2000 MG: 1 INJECTION, POWDER, FOR SOLUTION INTRAMUSCULAR; INTRAVENOUS at 12:09

## 2022-02-15 RX ADMIN — MIDAZOLAM 2 MG: 1 INJECTION INTRAMUSCULAR; INTRAVENOUS at 11:57

## 2022-02-15 RX ADMIN — POTASSIUM CHLORIDE AND SODIUM CHLORIDE: 900; 300 INJECTION, SOLUTION INTRAVENOUS at 23:27

## 2022-02-15 RX ADMIN — HYDROMORPHONE HYDROCHLORIDE 0.5 MG: 1 INJECTION, SOLUTION INTRAMUSCULAR; INTRAVENOUS; SUBCUTANEOUS at 15:29

## 2022-02-15 RX ADMIN — HYDROMORPHONE HYDROCHLORIDE 0.5 MG: 1 INJECTION, SOLUTION INTRAMUSCULAR; INTRAVENOUS; SUBCUTANEOUS at 15:07

## 2022-02-15 RX ADMIN — SUGAMMADEX 500 MG: 100 INJECTION, SOLUTION INTRAVENOUS at 14:25

## 2022-02-15 ASSESSMENT — PULMONARY FUNCTION TESTS
PIF_VALUE: 12
PIF_VALUE: 24
PIF_VALUE: 20
PIF_VALUE: 23
PIF_VALUE: 22
PIF_VALUE: 22
PIF_VALUE: 32
PIF_VALUE: 1
PIF_VALUE: 33
PIF_VALUE: 33
PIF_VALUE: 12
PIF_VALUE: 22
PIF_VALUE: 16
PIF_VALUE: 33
PIF_VALUE: 23
PIF_VALUE: 32
PIF_VALUE: 0
PIF_VALUE: 22
PIF_VALUE: 33
PIF_VALUE: 27
PIF_VALUE: 22
PIF_VALUE: 22
PIF_VALUE: 33
PIF_VALUE: 12
PIF_VALUE: 22
PIF_VALUE: 21
PIF_VALUE: 10
PIF_VALUE: 21
PIF_VALUE: 33
PIF_VALUE: 1
PIF_VALUE: 32
PIF_VALUE: 22
PIF_VALUE: 0
PIF_VALUE: 22
PIF_VALUE: 33
PIF_VALUE: 22
PIF_VALUE: 31
PIF_VALUE: 20
PIF_VALUE: 12
PIF_VALUE: 25
PIF_VALUE: 19
PIF_VALUE: 8
PIF_VALUE: 22
PIF_VALUE: 21
PIF_VALUE: 32
PIF_VALUE: 20
PIF_VALUE: 19
PIF_VALUE: 22
PIF_VALUE: 16
PIF_VALUE: 26
PIF_VALUE: 2
PIF_VALUE: 12
PIF_VALUE: 21
PIF_VALUE: 33
PIF_VALUE: 22
PIF_VALUE: 33
PIF_VALUE: 1
PIF_VALUE: 20
PIF_VALUE: 18
PIF_VALUE: 0
PIF_VALUE: 21
PIF_VALUE: 21
PIF_VALUE: 22
PIF_VALUE: 31
PIF_VALUE: 21
PIF_VALUE: 32
PIF_VALUE: 21
PIF_VALUE: 26
PIF_VALUE: 12
PIF_VALUE: 12
PIF_VALUE: 22
PIF_VALUE: 18
PIF_VALUE: 22
PIF_VALUE: 22
PIF_VALUE: 3
PIF_VALUE: 33
PIF_VALUE: 22
PIF_VALUE: 31
PIF_VALUE: 19
PIF_VALUE: 25
PIF_VALUE: 21
PIF_VALUE: 22
PIF_VALUE: 0
PIF_VALUE: 12
PIF_VALUE: 16
PIF_VALUE: 21
PIF_VALUE: 16
PIF_VALUE: 21
PIF_VALUE: 12
PIF_VALUE: 33
PIF_VALUE: 22
PIF_VALUE: 9
PIF_VALUE: 33
PIF_VALUE: 0
PIF_VALUE: 22
PIF_VALUE: 33
PIF_VALUE: 33
PIF_VALUE: 22
PIF_VALUE: 23
PIF_VALUE: 17
PIF_VALUE: 13
PIF_VALUE: 0
PIF_VALUE: 21
PIF_VALUE: 21
PIF_VALUE: 12
PIF_VALUE: 20
PIF_VALUE: 25
PIF_VALUE: 0
PIF_VALUE: 1
PIF_VALUE: 22
PIF_VALUE: 11
PIF_VALUE: 19
PIF_VALUE: 1
PIF_VALUE: 32
PIF_VALUE: 21
PIF_VALUE: 33
PIF_VALUE: 22
PIF_VALUE: 22
PIF_VALUE: 0
PIF_VALUE: 8
PIF_VALUE: 33
PIF_VALUE: 33
PIF_VALUE: 22
PIF_VALUE: 33
PIF_VALUE: 2
PIF_VALUE: 12
PIF_VALUE: 16
PIF_VALUE: 9
PIF_VALUE: 18
PIF_VALUE: 12
PIF_VALUE: 31
PIF_VALUE: 9
PIF_VALUE: 33
PIF_VALUE: 33
PIF_VALUE: 21
PIF_VALUE: 2
PIF_VALUE: 22
PIF_VALUE: 21
PIF_VALUE: 21
PIF_VALUE: 22
PIF_VALUE: 25
PIF_VALUE: 21
PIF_VALUE: 2
PIF_VALUE: 21
PIF_VALUE: 23
PIF_VALUE: 21
PIF_VALUE: 23
PIF_VALUE: 21
PIF_VALUE: 23
PIF_VALUE: 33
PIF_VALUE: 32
PIF_VALUE: 19
PIF_VALUE: 27
PIF_VALUE: 32
PIF_VALUE: 26
PIF_VALUE: 31
PIF_VALUE: 34
PIF_VALUE: 22
PIF_VALUE: 12

## 2022-02-15 ASSESSMENT — PAIN DESCRIPTION - DESCRIPTORS
DESCRIPTORS: ACHING;DISCOMFORT

## 2022-02-15 ASSESSMENT — PAIN DESCRIPTION - PROGRESSION
CLINICAL_PROGRESSION: GRADUALLY WORSENING
CLINICAL_PROGRESSION: GRADUALLY IMPROVING
CLINICAL_PROGRESSION: NOT CHANGED
CLINICAL_PROGRESSION: NOT CHANGED

## 2022-02-15 ASSESSMENT — PAIN DESCRIPTION - PAIN TYPE
TYPE: SURGICAL PAIN

## 2022-02-15 ASSESSMENT — PAIN DESCRIPTION - FREQUENCY
FREQUENCY: INTERMITTENT

## 2022-02-15 ASSESSMENT — PAIN DESCRIPTION - ORIENTATION
ORIENTATION: MID;LEFT
ORIENTATION: LOWER;LEFT
ORIENTATION: LOWER
ORIENTATION: MID

## 2022-02-15 ASSESSMENT — PAIN DESCRIPTION - ONSET
ONSET: ON-GOING

## 2022-02-15 ASSESSMENT — PAIN SCALES - GENERAL
PAINLEVEL_OUTOF10: 7
PAINLEVEL_OUTOF10: 0
PAINLEVEL_OUTOF10: 7
PAINLEVEL_OUTOF10: 3
PAINLEVEL_OUTOF10: 7
PAINLEVEL_OUTOF10: 8
PAINLEVEL_OUTOF10: 0

## 2022-02-15 ASSESSMENT — PAIN DESCRIPTION - LOCATION
LOCATION: ABDOMEN

## 2022-02-15 ASSESSMENT — PAIN - FUNCTIONAL ASSESSMENT
PAIN_FUNCTIONAL_ASSESSMENT: ACTIVITIES ARE NOT PREVENTED
PAIN_FUNCTIONAL_ASSESSMENT: PREVENTS OR INTERFERES SOME ACTIVE ACTIVITIES AND ADLS
PAIN_FUNCTIONAL_ASSESSMENT: PREVENTS OR INTERFERES SOME ACTIVE ACTIVITIES AND ADLS

## 2022-02-15 NOTE — PROGRESS NOTES
Internal Medicine Progress Note    [facility]    Jason Gibbs. John Escalera., TENOLOIDA Vasquez D.O., EBEN Ramos, MSN, APRN, NP-C  Renaldo Hector. Tita Cantu, MSN, APRN-CNP     Primary Care Physician: No primary care provider on file. Admitting Physician:  Joaquina Polanco MD  Admission date and time: 2/14/2022  9:32 AM    Room:  0322/0322-02  Admitting diagnosis: Mass of colon [K63.89]    Patient Name: Carlito Ignacio  MRN: [id number]    Date of Service: 2/15/2022     Subjective:  Rowena Dover is a 57 y.o. female who was seen and examined today,2/15/2022, at the bedside. Surgery was postponed yesterday secondary to hyperkalemia. Potassium stores have been repleted and she will undergo surgical intervention today. Review of System:   Constitutional:   Denies fever or chills, weight loss or gain, fatigue or malaise. HEENT:   Denies ear pain, sore throat, sinus or eye problems. Cardiovascular:   Denies any chest pain, irregular heartbeats, or palpitations. Respiratory:   Denies shortness of breath, coughing, sputum production, hemoptysis, or wheezing. Gastrointestinal:   Denies nausea, vomiting, diarrhea, or constipation. Denies any abdominal pain. Genitourinary:    Denies any urgency, frequency, hematuria. Voiding  without difficulty. Extremities:   Denies lower extremity swelling, edema or cyanosis. Neurology:    Denies any headache or focal neurological deficits, Denies generalized weakness or memory difficulty. Psch:   Denies being anxious or depressed. Musculoskeletal:    Denies  myalgias, joint complaints or back pain. Integumentary:   Denies any rashes, ulcers, or excoriations. Denies bruising. Hematologic/Lymphatic:  Denies bruising or bleeding.     Physical Exam:  I/O this shift:  In: 1515 [I.V.:1515]  Out: 200 [Urine:200]    Intake/Output Summary (Last 24 hours) at 2/15/2022 7033  Last data filed at 2/15/2022 0445  Gross per 24 hour   Intake 1515 ml   Output 200 ml   Net 1315 ml   No intake/output data recorded. Patient Vitals for the past 96 hrs (Last 3 readings):   Weight   02/14/22 1320 186 lb (84.4 kg)     Vital Signs:   Blood pressure 134/60, pulse 68, temperature 98 °F (36.7 °C), temperature source Oral, resp. rate 16, height 5' 6\" (1.676 m), weight 186 lb (84.4 kg), SpO2 96 %. General appearance:  Alert, responsive, oriented to person, place, and time. Well preserved, alert, no distress. Head:  Normocephalic. No masses, lesions or tenderness. Eyes:  PERRLA. EOMI. Sclera clear. Buccal mucosa moist.  ENT:  Ears normal. Mucosa normal.  Neck:    Supple. Trachea midline. No thyromegaly. No JVD. No bruits. Heart:    Rhythm regular. Rate controlled. No murmurs. Lungs:    Symmetrical. Clear to auscultation bilaterally. No wheezes. No rhonchi. No rales. Abdomen:   Soft. Non-tender. Non-distended. Bowel sounds positive. No organomegaly or masses. No pain on palpation. Extremities:    Peripheral pulses present. No peripheral edema. No ulcers. No cyanosis. No clubbing. Neurologic:    Alert x 3. No focal deficit. Cranial nerves grossly intact. No focal weakness. Psych:   Behavior is normal. Mood appears normal. Speech is not rapid and/or pressured. Musculoskeletal:   Spine ROM normal. Muscular strength intact. Gait not assessed. Integumentary:  No rashes  Skin normal color and texture.   Genitalia/Breast:  Deferred    Medication:  Scheduled Meds:   potassium bicarb-citric acid  40 mEq Oral Once    pantoprazole  40 mg Oral QAM AC    sodium chloride flush  5-40 mL IntraVENous 2 times per day    enoxaparin  40 mg SubCUTAneous Daily    budesonide  0.5 mg Nebulization BID    Arformoterol Tartrate  15 mcg Nebulization BID     Continuous Infusions:   sodium chloride      0.9% NaCl with KCl 40 mEq 100 mL/hr at 02/14/22 8685       Objective Data:  CBC with Differential:    Lab Results Component Value Date    WBC 5.3 02/15/2022    RBC 4.26 02/15/2022    HGB 12.3 02/15/2022    HCT 37.8 02/15/2022     02/15/2022    MCV 88.7 02/15/2022    MCH 28.9 02/15/2022    MCHC 32.5 02/15/2022    RDW 14.6 02/15/2022    SEGSPCT 57 03/25/2013    LYMPHOPCT 35.9 02/15/2022    MONOPCT 11.9 02/15/2022    BASOPCT 0.6 02/15/2022    MONOSABS 0.63 02/15/2022    LYMPHSABS 1.90 02/15/2022    EOSABS 0.11 02/15/2022    BASOSABS 0.03 02/15/2022     BMP:    Lab Results   Component Value Date     02/15/2022    K 3.5 02/15/2022    CL 99 02/15/2022    CO2 27 02/15/2022    BUN 10 02/15/2022    LABALBU 4.1 09/28/2021    LABALBU 4.4 02/24/2012    CREATININE 0.8 02/15/2022    CALCIUM 8.8 02/15/2022    GFRAA >60 02/15/2022    LABGLOM >60 02/15/2022    GLUCOSE 82 02/15/2022    GLUCOSE 117 02/24/2012       Assessment:  1. Colon mass with malignant appearance and potentially metastatic adenopathy with planned sigmoid colectomy +/- ostomy  2. Hypokalemia  3. COPD/emphysema with incidentally noted pulmonary nodule in the setting of longstanding tobacco abuse  4. Essential hypertension  5. Renal cell carcinoma post partial nephrectomy in 2016      Plan:   Potassium stores have been repleted and the patient is now cleared for surgical intervention. She is doing well otherwise and has no new complaints. She is eagerly anticipating surgical intervention today per continue current therapy. See orders for further plan of care. More than 50% of my time was spent at the bedside counseling/coordinating care with the patient and/or family with face to face contact. This time was spent reviewing notes and laboratory data as well as instructing and counseling the patient. Time I spent with the family or surrogate(s) is included only if the patient was incapable of providing the necessary information or participating in medical decisions.  I also discussed the differential diagnosis and all of the proposed management plans with the patient and individuals accompanying the patient. I am readily available for any further decision-making and intervention.        Stephen Richardson DO, [email or web address].  2/15/2022  6:43 AM

## 2022-02-15 NOTE — CARE COORDINATION
CM note: Pt is for OR today, will follow tomorrow for post op needs, if patient has an ostomy created will need hhc set up. Pt does not have a PCP but per hospitalist note patient will be establishing with Dr. Kaley Nieto at discharge.

## 2022-02-16 LAB
ANION GAP SERPL CALCULATED.3IONS-SCNC: 8 MMOL/L (ref 7–16)
BASOPHILS ABSOLUTE: 0.01 E9/L (ref 0–0.2)
BASOPHILS RELATIVE PERCENT: 0.1 % (ref 0–2)
BUN BLDV-MCNC: 7 MG/DL (ref 6–23)
CALCIUM SERPL-MCNC: 8 MG/DL (ref 8.6–10.2)
CHLORIDE BLD-SCNC: 105 MMOL/L (ref 98–107)
CO2: 24 MMOL/L (ref 22–29)
CREAT SERPL-MCNC: 0.7 MG/DL (ref 0.5–1)
EOSINOPHILS ABSOLUTE: 0 E9/L (ref 0.05–0.5)
EOSINOPHILS RELATIVE PERCENT: 0 % (ref 0–6)
GFR AFRICAN AMERICAN: >60
GFR NON-AFRICAN AMERICAN: >60 ML/MIN/1.73
GLUCOSE BLD-MCNC: 114 MG/DL (ref 74–99)
HCT VFR BLD CALC: 32.2 % (ref 34–48)
HEMOGLOBIN: 10.3 G/DL (ref 11.5–15.5)
IMMATURE GRANULOCYTES #: 0.05 E9/L
IMMATURE GRANULOCYTES %: 0.5 % (ref 0–5)
LYMPHOCYTES ABSOLUTE: 1.24 E9/L (ref 1.5–4)
LYMPHOCYTES RELATIVE PERCENT: 11.3 % (ref 20–42)
MCH RBC QN AUTO: 29.5 PG (ref 26–35)
MCHC RBC AUTO-ENTMCNC: 32 % (ref 32–34.5)
MCV RBC AUTO: 92.3 FL (ref 80–99.9)
MONOCYTES ABSOLUTE: 0.73 E9/L (ref 0.1–0.95)
MONOCYTES RELATIVE PERCENT: 6.6 % (ref 2–12)
NEUTROPHILS ABSOLUTE: 8.96 E9/L (ref 1.8–7.3)
NEUTROPHILS RELATIVE PERCENT: 81.5 % (ref 43–80)
PDW BLD-RTO: 14.6 FL (ref 11.5–15)
PLATELET # BLD: 213 E9/L (ref 130–450)
PMV BLD AUTO: 9.7 FL (ref 7–12)
POTASSIUM REFLEX MAGNESIUM: 3.7 MMOL/L (ref 3.5–5)
RBC # BLD: 3.49 E12/L (ref 3.5–5.5)
SODIUM BLD-SCNC: 137 MMOL/L (ref 132–146)
WBC # BLD: 11 E9/L (ref 4.5–11.5)

## 2022-02-16 PROCEDURE — 80048 BASIC METABOLIC PNL TOTAL CA: CPT

## 2022-02-16 PROCEDURE — 6360000002 HC RX W HCPCS: Performed by: SURGERY

## 2022-02-16 PROCEDURE — 1200000000 HC SEMI PRIVATE

## 2022-02-16 PROCEDURE — 94640 AIRWAY INHALATION TREATMENT: CPT

## 2022-02-16 PROCEDURE — 2700000000 HC OXYGEN THERAPY PER DAY

## 2022-02-16 PROCEDURE — 85025 COMPLETE CBC W/AUTO DIFF WBC: CPT

## 2022-02-16 PROCEDURE — 6360000002 HC RX W HCPCS: Performed by: NURSE PRACTITIONER

## 2022-02-16 PROCEDURE — 6370000000 HC RX 637 (ALT 250 FOR IP): Performed by: SURGERY

## 2022-02-16 PROCEDURE — 36415 COLL VENOUS BLD VENIPUNCTURE: CPT

## 2022-02-16 RX ADMIN — OXYCODONE 5 MG: 5 TABLET ORAL at 09:39

## 2022-02-16 RX ADMIN — OXYCODONE 10 MG: 5 TABLET ORAL at 16:44

## 2022-02-16 RX ADMIN — BUDESONIDE 500 MCG: 0.5 INHALANT RESPIRATORY (INHALATION) at 06:40

## 2022-02-16 RX ADMIN — POTASSIUM CHLORIDE AND SODIUM CHLORIDE: 900; 300 INJECTION, SOLUTION INTRAVENOUS at 18:48

## 2022-02-16 RX ADMIN — OXYCODONE 5 MG: 5 TABLET ORAL at 13:29

## 2022-02-16 RX ADMIN — ENOXAPARIN SODIUM 40 MG: 100 INJECTION SUBCUTANEOUS at 13:25

## 2022-02-16 RX ADMIN — ARFORMOTEROL TARTRATE 15 MCG: 15 SOLUTION RESPIRATORY (INHALATION) at 06:40

## 2022-02-16 RX ADMIN — PANTOPRAZOLE SODIUM 40 MG: 40 TABLET, DELAYED RELEASE ORAL at 05:52

## 2022-02-16 ASSESSMENT — PAIN DESCRIPTION - DESCRIPTORS
DESCRIPTORS: ACHING;DISCOMFORT;SORE;TENDER
DESCRIPTORS: DISCOMFORT

## 2022-02-16 ASSESSMENT — PAIN DESCRIPTION - ONSET: ONSET: ON-GOING

## 2022-02-16 ASSESSMENT — PAIN DESCRIPTION - PAIN TYPE
TYPE: ACUTE PAIN;SURGICAL PAIN
TYPE: SURGICAL PAIN

## 2022-02-16 ASSESSMENT — PAIN DESCRIPTION - LOCATION
LOCATION: ABDOMEN
LOCATION: ABDOMEN

## 2022-02-16 ASSESSMENT — PAIN DESCRIPTION - PROGRESSION
CLINICAL_PROGRESSION: GRADUALLY IMPROVING
CLINICAL_PROGRESSION: NOT CHANGED
CLINICAL_PROGRESSION: NOT CHANGED

## 2022-02-16 ASSESSMENT — PAIN SCALES - GENERAL
PAINLEVEL_OUTOF10: 9
PAINLEVEL_OUTOF10: 4
PAINLEVEL_OUTOF10: 0

## 2022-02-16 ASSESSMENT — PAIN DESCRIPTION - FREQUENCY: FREQUENCY: CONTINUOUS

## 2022-02-16 NOTE — PROGRESS NOTES
Internal Medicine Progress Note    [facility]    Jason Gibbs. John Escalera.LUCRECIA D.O., EBEN Ramos, MSN, APRN, NP-C  Renaldo Hector. Tita Cantu, MSN, APRN-CNP     Primary Care Physician: No primary care provider on file. Admitting Physician:  Joaquina Polanco MD  Admission date and time: 2/14/2022  9:32 AM    Room:  0322/0322-02  Admitting diagnosis: Mass of colon [K63.89]    Patient Name: Carlito Ignacio  MRN: [id number]    Date of Service: 2/16/2022     Subjective:  Rowena Dover is a 57 y.o. female who was seen and examined today,2/16/2022, at the bedside. Rowena Dover underwent surgical intervention yesterday with laparoscopic converted to open sigmoid colon resection and end colostomy. She is doing well postoperatively with expected postoperative pain. Ostomy is in place with very minimal liquid output identified. We discussed ambulation today and she voiced understanding and agreement. She will discuss surgical findings with the surgical team today. Review of System:   Constitutional:   Denies fever or chills, weight loss or gain, fatigue or malaise. HEENT:   Denies ear pain, sore throat, sinus or eye problems. Cardiovascular:   Denies any chest pain, irregular heartbeats, or palpitations. Respiratory:   Denies shortness of breath, coughing, sputum production, hemoptysis, or wheezing. Gastrointestinal:   Postoperative abdominal pain as to be expected. Genitourinary:    Denies any urgency, frequency, hematuria. Voiding with use of a Nugent catheter. Extremities:   Denies lower extremity swelling, edema or cyanosis. Neurology:    Denies any headache or focal neurological deficits, Denies generalized weakness or memory difficulty. Psch:   Denies being anxious or depressed. Musculoskeletal:    Denies  myalgias, joint complaints or back pain. Integumentary:   Denies any rashes, ulcers, or excoriations. Denies bruising. Hematologic/Lymphatic:  Denies bruising or bleeding. Physical Exam:  I/O this shift:  In: -   Out: 850 [Urine:850]    Intake/Output Summary (Last 24 hours) at 2/16/2022 0638  Last data filed at 2/16/2022 4055  Gross per 24 hour   Intake 2200 ml   Output 1525 ml   Net 675 ml   I/O last 3 completed shifts: In: 9250 [I.V.:3715]  Out: 875 [Urine:775; Blood:100]  Patient Vitals for the past 96 hrs (Last 3 readings):   Weight   02/14/22 1320 186 lb (84.4 kg)     Vital Signs:   Blood pressure 138/64, pulse 72, temperature 98.5 °F (36.9 °C), temperature source Oral, resp. rate 16, height 5' 6\" (1.676 m), weight 186 lb (84.4 kg), SpO2 97 %. General appearance:  Alert, responsive, oriented to person, place, and time. Well preserved, alert, no distress. Head:  Normocephalic. No masses, lesions or tenderness. Eyes:  PERRLA. EOMI. Sclera clear. Buccal mucosa moist.  ENT:  Ears normal. Mucosa normal.  Neck:    Supple. Trachea midline. No thyromegaly. No JVD. No bruits. Heart:    Rhythm regular. Rate controlled. No murmurs. Lungs:    Symmetrical. Clear to auscultation bilaterally. No wheezes. No rhonchi. No rales. Abdomen:   Abdomen is soft overall. Ostomy is in place with very minimal liquid output. Dressings are in place. There is pain to palpation diffusely with voluntary guarding elicited. Bowel sounds are hypoactive. Extremities:    Peripheral pulses present. No peripheral edema. No ulcers. No cyanosis. No clubbing. Neurologic:    Alert x 3. No focal deficit. Cranial nerves grossly intact. No focal weakness. Psych:   Behavior is normal. Mood appears normal. Speech is not rapid and/or pressured. Musculoskeletal:   Spine ROM normal. Muscular strength intact. Gait not assessed. Integumentary:  No rashes  Skin normal color and texture. Genitalia/Breast:  Voiding with use of a Nugent catheter.   Medication:  Scheduled Meds:   pantoprazole  40 mg Oral QAM AC    sodium chloride flush 5-40 mL IntraVENous 2 times per day    enoxaparin  40 mg SubCUTAneous Daily    budesonide  0.5 mg Nebulization BID    Arformoterol Tartrate  15 mcg Nebulization BID     Continuous Infusions:   sodium chloride 0  (02/15/22 1236)    0.9% NaCl with KCl 40 mEq 100 mL/hr at 02/15/22 4717       Objective Data:  CBC with Differential:    Lab Results   Component Value Date    WBC 11.0 02/16/2022    RBC 3.49 02/16/2022    HGB 10.3 02/16/2022    HCT 32.2 02/16/2022     02/16/2022    MCV 92.3 02/16/2022    MCH 29.5 02/16/2022    MCHC 32.0 02/16/2022    RDW 14.6 02/16/2022    SEGSPCT 57 03/25/2013    LYMPHOPCT 11.3 02/16/2022    MONOPCT 6.6 02/16/2022    BASOPCT 0.1 02/16/2022    MONOSABS 0.73 02/16/2022    LYMPHSABS 1.24 02/16/2022    EOSABS 0.00 02/16/2022    BASOSABS 0.01 02/16/2022     BMP:    Lab Results   Component Value Date     02/16/2022    K 3.7 02/16/2022     02/16/2022    CO2 24 02/16/2022    BUN 7 02/16/2022    LABALBU 4.1 09/28/2021    LABALBU 4.4 02/24/2012    CREATININE 0.7 02/16/2022    CALCIUM 8.0 02/16/2022    GFRAA >60 02/16/2022    LABGLOM >60 02/16/2022    GLUCOSE 114 02/16/2022    GLUCOSE 117 02/24/2012       Assessment:  1. Abdominal masses status post laparoscopic converted to open sigmoid colon resection with end colostomy  2. COPD/emphysema with incidentally noted pulmonary nodule in the setting of longstanding tobacco abuse  3. Essential hypertension  4. Renal cell carcinoma post partial nephrectomy in 2016      Plan:   Amaya Eisenberg required conversion to open surgery yesterday and seems to be doing well postoperatively. Ostomy is in place with minimal liquid output. Diet will be advanced at the discretion of the surgical team.  Appropriate pain control is being administered. We discussed the need for early ambulation to promote gut motility. We will await final pathology report as we rule out a malignant process.     More than 50% of my time was spent at the bedside counseling/coordinating care with the patient and/or family with face to face contact. This time was spent reviewing notes and laboratory data as well as instructing and counseling the patient. Time I spent with the family or surrogate(s) is included only if the patient was incapable of providing the necessary information or participating in medical decisions. I also discussed the differential diagnosis and all of the proposed management plans with the patient and individuals accompanying the patient. I am readily available for any further decision-making and intervention.        Shayy Carmichael DO, F.A.C.O.I.  2/16/2022  6:38 AM

## 2022-02-16 NOTE — PROGRESS NOTES
GENERAL SURGERY  DAILY PROGRESS NOTE  2/16/2022    No chief complaint on file. Subjective:  Doing well this morning, minimal pain controlled with medications. Tolerated clear liquids. Having ostomy output     Objective:  /64   Pulse 72   Temp 98.5 °F (36.9 °C) (Oral)   Resp 16   Ht 5' 6\" (1.676 m)   Wt 186 lb (84.4 kg)   SpO2 96%   BMI 30.02 kg/m²     GENERAL:  Laying in bed, awake, alert, cooperative, no apparent distress  HEAD: Normocephalic, atraumatic  EYES: No sclera icterus, pupils equal  LUNGS:  No increased work of breathing  CARDIOVASCULAR:  RR  ABDOMEN:  Soft, non-distended. Appropriately tender.  Ostomy pink with liquid stool and gas in bag  EXTREMITIES: No edema or swelling  SKIN: Warm and dry    Assessment/Plan:  57 y.o. female with sigmoid mass s/p open Genna's procedure 2/15    - ambulate  - pain and nausea control  - tolerating clears, slowly advance  - await pathology    Electronically signed by Adelaide Goldmann, MD on 2/16/2022 at 7:25 AM

## 2022-02-16 NOTE — CARE COORDINATION
CM note: [facility] unable to accept d/t lack of staffing. Referral called to Chloe West with [facility], anticipate discharge Friday with start of care on Saturday. Pt will still need lessons with ostomy nurse.

## 2022-02-16 NOTE — PROGRESS NOTES
Comprehensive Nutrition Assessment    Type and Reason for Visit:  Initial,Positive Nutrition Screen    Nutrition Recommendations/Plan: Start Ensure Enlive BID    Nutrition Assessment:  Pt admits  w/ Colon Mass s/p open sigmoid colectomy w/ end colostomy. , PMH of COPD, Renal CA 2016. Pt tolerating diet(full liquid), minimal PO intake, advance when medically feasible. Will provide ONS BID and monitor    Malnutrition Assessment:  Malnutrition Status: At risk for malnutrition (Comment)    Context:  Acute Illness     Findings of the 6 clinical characteristics of malnutrition:  Energy Intake:  Mild decrease in energy intake (Comment)  Weight Loss:  Unable to assess (no accurate wt hx)     Body Fat Loss:  No significant body fat loss     Muscle Mass Loss:  No significant muscle mass loss    Fluid Accumulation:  No significant fluid accumulation     Strength:  Not Performed    Estimated Daily Nutrient Needs:  Energy (kcal):  ; Weight Used for Energy Requirements:  Admission     Protein (g):  75-85 (x1.3-1.5gm/kg); Weight Used for Protein Requirements:  Ideal        Fluid (ml/day):  ; Method Used for Fluid Requirements:  1 ml/kcal      Nutrition Related Findings:  hypoactive BS, ostomy tube, +I/O  +1.5L      Wounds:  Surgical Incision       Current Nutrition Therapies:    ADULT DIET; Full Liquid  ADULT ORAL NUTRITION SUPPLEMENT; Lunch, Dinner; Standard High Calorie/High Protein Oral Supplement    Anthropometric Measures:  · Height: 5' 5\" (165.1 cm)  · Current Body Weight: 186 lb (84.4 kg)     · Ideal Body Weight: 125 lbs; % Ideal Body Weight 148.8 %   · BMI: 31  · Adjusted Body Weight:  ; No Adjustment   · BMI Categories: Obese Class 1 (BMI 30.0-34. 9)       Nutrition Diagnosis:   · Inadequate oral intake related to altered GI structure as evidenced by intake 0-25%,wounds (surg.  wound)      Nutrition Interventions:   Food and/or Nutrient Delivery:  Continue Current Diet,Start Oral Nutrition Supplement  Nutrition Education/Counseling:  No recommendation at this time   Coordination of Nutrition Care:  Continue to monitor while inpatient    Goals:  Consume >50% meals/ONS       Nutrition Monitoring and Evaluation:   Behavioral-Environmental Outcomes:  None Identified   Food/Nutrient Intake Outcomes:  Food and Nutrient Intake,Supplement Intake  Physical Signs/Symptoms Outcomes:  Biochemical Data,GI Status,Nutrition Focused Physical Findings,Skin,Weight     Discharge Planning:     Too soon to determine     Electronically signed by Jamie Manzanares RD, LD on 2/16/22 at 10:33 AM EST    Contact: [phone]

## 2022-02-16 NOTE — ANESTHESIA POSTPROCEDURE EVALUATION
Department of Anesthesiology  Postprocedure Note    Patient: Jose R Mckenna  MRN: [id number]  YOB: 1960  Date of evaluation: 2/16/2022  Time:  8:06 AM     Procedure Summary     Date: 02/15/22 Room / Location: [address] [place] [address] / [address] [place] [address]    Anesthesia Start: 1150 Anesthesia Stop: 7967    Procedure: LAPAROSCOPIC CONVERTED TO OPEN SIGMOID COLECTOMY WITH END COLOSTOMY (N/A Abdomen) Diagnosis: (SIGMOID COLON MASS)    Surgeons: Yamilet Hull MD Responsible Provider: Berna Adams DO    Anesthesia Type: general ASA Status: 2          Anesthesia Type: general    John Phase I: John Score: 9    John Phase II:      Last vitals: Reviewed and per EMR flowsheets.        Anesthesia Post Evaluation    Patient location during evaluation: PACU  Patient participation: complete - patient participated  Level of consciousness: awake and alert  Airway patency: patent  Nausea & Vomiting: no nausea and no vomiting  Complications: no  Cardiovascular status: hemodynamically stable  Respiratory status: acceptable  Hydration status: euvolemic

## 2022-02-16 NOTE — CARE COORDINATION
CM note: Met with patient for transition of care planning. Pt had a new colostomy created yesterday d/t colon mass. She is in agreement with home care for ostomy teaching and has no preference of provider. Pt to get established with new PCP,  Dr. Samson Darling at discharge. CM spoke with Dr. Carmelo Aguilar and he is willing to sign for home care until this happens. Reviewed Dr. Sasha Rivers preferred provider, [facility], with patient and she is in agreement with a referral being made to them as she has no preference. Referral called to Daniel Dubois with [facility] and we await review. If unable to accept, CM will make referral to [facility] per discussion with patient. Pt currently lives with her SO. She was independent with ADLs/IADLs prior to hospitalization. No use of DME. She denies any other discharge needs.

## 2022-02-17 VITALS
HEART RATE: 95 BPM | OXYGEN SATURATION: 97 % | HEIGHT: 65 IN | RESPIRATION RATE: 16 BRPM | BODY MASS INDEX: 30.99 KG/M2 | DIASTOLIC BLOOD PRESSURE: 82 MMHG | WEIGHT: 186 LBS | SYSTOLIC BLOOD PRESSURE: 138 MMHG | TEMPERATURE: 98.6 F

## 2022-02-17 LAB
ANION GAP SERPL CALCULATED.3IONS-SCNC: 8 MMOL/L (ref 7–16)
BASOPHILS ABSOLUTE: 0.03 E9/L (ref 0–0.2)
BASOPHILS RELATIVE PERCENT: 0.3 % (ref 0–2)
BUN BLDV-MCNC: 8 MG/DL (ref 6–23)
CALCIUM SERPL-MCNC: 8.4 MG/DL (ref 8.6–10.2)
CHLORIDE BLD-SCNC: 105 MMOL/L (ref 98–107)
CO2: 24 MMOL/L (ref 22–29)
CREAT SERPL-MCNC: 0.6 MG/DL (ref 0.5–1)
EOSINOPHILS ABSOLUTE: 0.02 E9/L (ref 0.05–0.5)
EOSINOPHILS RELATIVE PERCENT: 0.2 % (ref 0–6)
GFR AFRICAN AMERICAN: >60
GFR NON-AFRICAN AMERICAN: >60 ML/MIN/1.73
GLUCOSE BLD-MCNC: 95 MG/DL (ref 74–99)
HCT VFR BLD CALC: 31.3 % (ref 34–48)
HEMOGLOBIN: 9.7 G/DL (ref 11.5–15.5)
IMMATURE GRANULOCYTES #: 0.07 E9/L
IMMATURE GRANULOCYTES %: 0.7 % (ref 0–5)
LYMPHOCYTES ABSOLUTE: 1.6 E9/L (ref 1.5–4)
LYMPHOCYTES RELATIVE PERCENT: 15.2 % (ref 20–42)
MCH RBC QN AUTO: 29.4 PG (ref 26–35)
MCHC RBC AUTO-ENTMCNC: 31 % (ref 32–34.5)
MCV RBC AUTO: 94.8 FL (ref 80–99.9)
MONOCYTES ABSOLUTE: 0.85 E9/L (ref 0.1–0.95)
MONOCYTES RELATIVE PERCENT: 8.1 % (ref 2–12)
NEUTROPHILS ABSOLUTE: 7.96 E9/L (ref 1.8–7.3)
NEUTROPHILS RELATIVE PERCENT: 75.5 % (ref 43–80)
PDW BLD-RTO: 15.4 FL (ref 11.5–15)
PLATELET # BLD: 226 E9/L (ref 130–450)
PMV BLD AUTO: 10.4 FL (ref 7–12)
POTASSIUM REFLEX MAGNESIUM: 4.3 MMOL/L (ref 3.5–5)
RBC # BLD: 3.3 E12/L (ref 3.5–5.5)
SODIUM BLD-SCNC: 137 MMOL/L (ref 132–146)
WBC # BLD: 10.5 E9/L (ref 4.5–11.5)

## 2022-02-17 PROCEDURE — 2580000003 HC RX 258: Performed by: SURGERY

## 2022-02-17 PROCEDURE — 6370000000 HC RX 637 (ALT 250 FOR IP): Performed by: SURGERY

## 2022-02-17 PROCEDURE — 6360000002 HC RX W HCPCS: Performed by: SURGERY

## 2022-02-17 PROCEDURE — 36415 COLL VENOUS BLD VENIPUNCTURE: CPT

## 2022-02-17 PROCEDURE — 94640 AIRWAY INHALATION TREATMENT: CPT

## 2022-02-17 PROCEDURE — 85025 COMPLETE CBC W/AUTO DIFF WBC: CPT

## 2022-02-17 PROCEDURE — 6360000002 HC RX W HCPCS: Performed by: NURSE PRACTITIONER

## 2022-02-17 PROCEDURE — 80048 BASIC METABOLIC PNL TOTAL CA: CPT

## 2022-02-17 RX ORDER — OXYCODONE HYDROCHLORIDE 5 MG/1
5 TABLET ORAL EVERY 6 HOURS PRN
Qty: 20 TABLET | Refills: 0 | Status: SHIPPED | OUTPATIENT
Start: 2022-02-17 | End: 2022-02-22

## 2022-02-17 RX ADMIN — PANTOPRAZOLE SODIUM 40 MG: 40 TABLET, DELAYED RELEASE ORAL at 05:30

## 2022-02-17 RX ADMIN — POTASSIUM CHLORIDE AND SODIUM CHLORIDE: 900; 300 INJECTION, SOLUTION INTRAVENOUS at 02:00

## 2022-02-17 RX ADMIN — ENOXAPARIN SODIUM 40 MG: 100 INJECTION SUBCUTANEOUS at 13:20

## 2022-02-17 RX ADMIN — Medication 10 ML: at 12:30

## 2022-02-17 RX ADMIN — BUDESONIDE 500 MCG: 0.5 INHALANT RESPIRATORY (INHALATION) at 06:17

## 2022-02-17 RX ADMIN — ARFORMOTEROL TARTRATE 15 MCG: 15 SOLUTION RESPIRATORY (INHALATION) at 06:17

## 2022-02-17 RX ADMIN — OXYCODONE 10 MG: 5 TABLET ORAL at 08:28

## 2022-02-17 ASSESSMENT — PAIN SCALES - GENERAL
PAINLEVEL_OUTOF10: 8
PAINLEVEL_OUTOF10: 4

## 2022-02-17 ASSESSMENT — PAIN DESCRIPTION - DESCRIPTORS: DESCRIPTORS: DISCOMFORT

## 2022-02-17 ASSESSMENT — PAIN DESCRIPTION - PROGRESSION: CLINICAL_PROGRESSION: GRADUALLY IMPROVING

## 2022-02-17 ASSESSMENT — PAIN DESCRIPTION - LOCATION: LOCATION: ABDOMEN

## 2022-02-17 ASSESSMENT — PAIN DESCRIPTION - PAIN TYPE: TYPE: SURGICAL PAIN

## 2022-02-17 NOTE — PROGRESS NOTES
ET Nurse  Admit Date: 2/14/2022  9:32 AM    Reason for consult:  New colostomy    Significant history:    Past Medical History:   Diagnosis Date    Anxiety     Cancer [facility]) 2016    Kidney    Irritable bowel syndrome     Obesity        Stoma assessment:  Stoma good red color large  Peristomal skin is clear  Stoma functioning for liquid stool      Plan:    Lesson in care  Supplies and inst given for home  coloplast 2 piece system wafer #[id number]  coloplast pouch #[id number]  coloplast paste  Brava Barrier strips  Pt states her daughter is a nurse that will assist her  Plan on home care  inst re empying pouch    Carrie Collet, RN 2/17/2022 3:00 PM

## 2022-02-17 NOTE — PLAN OF CARE
Problem: Nausea/Vomiting:  Goal: Absence of nausea/vomiting  Outcome: Met This Shift     Problem: Nausea/Vomiting:  Goal: Able to drink  Outcome: Met This Shift     Problem: Nausea/Vomiting:  Goal: Able to eat  Outcome: Met This Shift     Problem: Nausea/Vomiting:  Goal: Ability to achieve adequate nutritional intake will improve  Outcome: Met This Shift     Problem: Falls - Risk of:  Goal: Will remain free from falls  Outcome: Met This Shift     Problem: Falls - Risk of:  Goal: Absence of physical injury  Outcome: Met This Shift     Problem: Pain:  Goal: Control of acute pain  Outcome: Met This Shift     Problem: Pain:  Goal: Control of chronic pain  Outcome: Met This Shift

## 2022-02-17 NOTE — PROGRESS NOTES
GENERAL SURGERY  DAILY PROGRESS NOTE  2/17/2022    No chief complaint on file. Subjective:  Doing well this morning, minimal pain controlled with medications. Tolerated clear liquids. Having ostomy output     Objective:  /82   Pulse 95   Temp 98.6 °F (37 °C)   Resp 16   Ht 5' 5\" (1.651 m)   Wt 186 lb (84.4 kg)   SpO2 96%   BMI 30.95 kg/m²     GENERAL:  Laying in bed, awake, alert, cooperative, no apparent distress  HEAD: Normocephalic, atraumatic  EYES: No sclera icterus, pupils equal  LUNGS:  No increased work of breathing  CARDIOVASCULAR:  RR  ABDOMEN:  Soft, non-distended. Appropriately tender. Ostomy pink with liquid stool and gas in bag  EXTREMITIES: No edema or swelling  SKIN: Warm and dry    Assessment/Plan:  57 y.o. female with sigmoid mass s/p open Genna's procedure 2/15      - continue diet as tolerated  - ostomy teaching today  - discharge soon    Electronically signed by Liberty Suarez MD on 2/17/2022 at 6:42 AM    As above, ostomy nurse to see. Possible discharge home later today.

## 2022-02-17 NOTE — PROGRESS NOTES
Internal Medicine Progress Note    [facility]    Dee Gamez. Tayo Luque., F.A.ENRIQUEOBrunaI. Lisbeth Dasilva D.O., LUCRECIA Gil D.O. Adamaris Londono, MSN, APRN, NP-C  Michelle Lang. Bev Wilson, MSN, APRN-CNP     Primary Care Physician: No primary care provider on file. Admitting Physician:  Sacha Domínguez MD  Admission date and time: 2/14/2022  9:32 AM    Room:  [facility]/0322-  Admitting diagnosis: Mass of colon [K63.89]    Patient Name: Joe Comer  MRN: [id number]    Date of Service: 2/17/2022     Subjective:  Giovanni Albert is a 57 y.o. female who was seen and examined today,2/17/2022, at the bedside. Giovanni Albert is doing very well postoperatively and is having appropriate ostomy output. She is tolerating a diet and has been ambulatory. She is anxious for discharge home. Her pain is otherwise well controlled. Review of System:   Constitutional:   Denies fever or chills, weight loss or gain, fatigue or malaise. HEENT:   Denies ear pain, sore throat, sinus or eye problems. Cardiovascular:   Denies any chest pain, irregular heartbeats, or palpitations. Respiratory:   Denies shortness of breath, coughing, sputum production, hemoptysis, or wheezing. Gastrointestinal:   Very minimal postoperative abdominal pain. She is having adequate ostomy output. Genitourinary:    Denies any urgency, frequency, hematuria. Voiding with use of a Nugent catheter. Extremities:   Denies lower extremity swelling, edema or cyanosis. Neurology:    Denies any headache or focal neurological deficits, Denies generalized weakness or memory difficulty. Psch:   Denies being anxious or depressed. Musculoskeletal:    Denies  myalgias, joint complaints or back pain. Integumentary:   Denies any rashes, ulcers, or excoriations. Denies bruising. Hematologic/Lymphatic:  Denies bruising or bleeding.     Physical Exam:  I/O this shift:  In: -   Out: 1500 [Urine:900; Stool:600]    Intake/Output Summary (Last 24 hours) at 2/17/2022 0642  Last data filed at 2/17/2022 0530  Gross per 24 hour   Intake 1070 ml   Output 2500 ml   Net -1430 ml   I/O last 3 completed shifts: In: 8638 [P.O.:320; I.V.:2950]  Out: 9298 [Urine:1875; Stool:550; Blood:100]  Patient Vitals for the past 96 hrs (Last 3 readings):   Weight   02/14/22 1320 186 lb (84.4 kg)     Vital Signs:   Blood pressure 138/82, pulse 95, temperature 98.6 °F (37 °C), resp. rate 16, height 5' 5\" (1.651 m), weight 186 lb (84.4 kg), SpO2 96 %. General appearance:  Alert, responsive, oriented to person, place, and time. Well preserved, alert, no distress. Head:  Normocephalic. No masses, lesions or tenderness. Eyes:  PERRLA. EOMI. Sclera clear. Buccal mucosa moist.  ENT:  Ears normal. Mucosa normal.  Neck:    Supple. Trachea midline. No thyromegaly. No JVD. No bruits. Heart:    Rhythm regular. Rate controlled. No murmurs. Lungs:    Symmetrical. Clear to auscultation bilaterally. No wheezes. No rhonchi. No rales. Abdomen:   Ostomy is in place with liquid output. Bowel sounds are active. Very mildly tender to palpation diffusely as to be expected. Extremities:    Peripheral pulses present. No peripheral edema. No ulcers. No cyanosis. No clubbing. Neurologic:    Alert x 3. No focal deficit. Cranial nerves grossly intact. No focal weakness. Psych:   Behavior is normal. Mood appears normal. Speech is not rapid and/or pressured. Musculoskeletal:   Spine ROM normal. Muscular strength intact. Gait not assessed. Integumentary:  No rashes  Skin normal color and texture. Genitalia/Breast:  Voiding with use of a Nugent catheter.     Medication:  Scheduled Meds:   pantoprazole  40 mg Oral QAM AC    sodium chloride flush  5-40 mL IntraVENous 2 times per day    enoxaparin  40 mg SubCUTAneous Daily    budesonide  0.5 mg Nebulization BID    Arformoterol Tartrate  15 mcg Nebulization BID     Continuous Infusions:   sodium chloride 0  (02/15/22 1236)       Objective Data:  CBC with Differential:    Lab Results   Component Value Date    WBC 10.5 02/17/2022    RBC 3.30 02/17/2022    HGB 9.7 02/17/2022    HCT 31.3 02/17/2022     02/17/2022    MCV 94.8 02/17/2022    MCH 29.4 02/17/2022    MCHC 31.0 02/17/2022    RDW 15.4 02/17/2022    SEGSPCT 57 03/25/2013    LYMPHOPCT 15.2 02/17/2022    MONOPCT 8.1 02/17/2022    BASOPCT 0.3 02/17/2022    MONOSABS 0.85 02/17/2022    LYMPHSABS 1.60 02/17/2022    EOSABS 0.02 02/17/2022    BASOSABS 0.03 02/17/2022     BMP:    Lab Results   Component Value Date     02/16/2022    K 3.7 02/16/2022     02/16/2022    CO2 24 02/16/2022    BUN 7 02/16/2022    LABALBU 4.1 09/28/2021    LABALBU 4.4 02/24/2012    CREATININE 0.7 02/16/2022    CALCIUM 8.0 02/16/2022    GFRAA >60 02/16/2022    LABGLOM >60 02/16/2022    GLUCOSE 114 02/16/2022    GLUCOSE 117 02/24/2012       Assessment:  1. Abdominal masses status post laparoscopic converted to open sigmoid colon resection with end colostomy  2. COPD/emphysema with incidentally noted pulmonary nodule in the setting of longstanding tobacco abuse  3. Essential hypertension  4. Renal cell carcinoma post partial nephrectomy in 2016      Plan:   Brett Goodman is doing very well postoperatively. Diet has been advanced and she is having appropriate ostomy output. Her pain is well controlled and she has been ambulatory already. We will clarify ongoing need for Nugent catheterization with the general surgery team.  Pathology report is pending at this point. Chronic comorbidities are otherwise well controlled and the patient is anxious for discharge home and close outpatient follow-up. More than 50% of my time was spent at the bedside counseling/coordinating care with the patient and/or family with face to face contact. This time was spent reviewing notes and laboratory data as well as instructing and counseling the patient.  Time I spent with the family or surrogate(s) is included only if the patient was incapable of providing the necessary information or participating in medical decisions. I also discussed the differential diagnosis and all of the proposed management plans with the patient and individuals accompanying the patient. I am readily available for any further decision-making and intervention.        Marisa Benz DO, F.A.C.O.I.  2/17/2022  6:42 AM

## 2022-02-17 NOTE — CARE COORDINATION
CM note; Plan remains for patient to return home with [facility] to start care on 2/19 for new ostomy. Pt still needs seen by ostomy nurse and will need teaching. Having ostomy output and tolerating PO. Has no PCP but has agreed to establish with Dr. Dickson Calzada at discharge.

## 2022-02-18 NOTE — DISCHARGE SUMMARY
Physician Discharge Summary     Patient ID:  Dm Mary  [id number]  89 y.o.  1960    Admit date: 2/14/2022    Discharge date and time: 2/17/2022  3:57 PM     Admitting Physician: Laureano العلي MD     Admission Diagnoses: Mass of colon [K63.89]    Discharge Diagnoses: Active Problems:    Mass of colon  Resolved Problems:    * No resolved hospital problems. *      Admission Condition: good    Discharged Condition: stable      Hospital Course:  Dm Mary is a 57 y.o. female who presented for elective sigmoid colectomy. She underwent laparoscopic converted to open sigmoid colectomy with end colostomy creation. Post operatively,  She progressed well, pain was controlled on PO medications. She was tolerating a regular diet with no nausea or vomiting, ostomy was functioning appropriately and she was in a suitable condition for discharge to home in stable condition. Consults:   IP CONSULT TO HOSPITALIST  IP CONSULT TO HOME CARE NEEDS    Significant Diagnostic Studies:   No results found. Discharge Exam:  GENERAL:  Laying in bed, awake, alert, cooperative, no apparent distress  HEAD: Normocephalic, atraumatic  EYES: No sclera icterus, pupils equal  LUNGS:  No increased work of breathing  CARDIOVASCULAR:  RR  ABDOMEN:  Soft, non-distended. Appropriately tender. Ostomy pink with liquid stool and gas in bag  EXTREMITIES: No edema or swelling  SKIN: Warm and dry    Disposition: home    In process/preliminary results:  Outstanding Order Results     Date and Time Order Name Status Description    2/15/2022 12:00 AM Surgical Pathology In process           Patient Instructions:   Discharge Medication List as of 2/17/2022  3:33 PM      START taking these medications    Details   oxyCODONE (ROXICODONE) 5 MG immediate release tablet Take 1 tablet by mouth every 6 hours as needed for Pain for up to 5 days. , Disp-20 tablet, R-0Print         CONTINUE these medications which have NOT CHANGED    Details ondansetron (ZOFRAN-ODT) 4 MG disintegrating tablet Take 1 tablet by mouth 3 times daily as needed for Nausea or Vomiting, Disp-21 tablet, R-0Normal      triamterene-hydrochlorothiazide (MAXZIDE-25) 37.5-25 MG per tablet Take 1 tablet by mouth daily, Disp-90 tablet, R-0Normal      carvedilol (COREG) 6.25 MG tablet Take 1 tablet by mouth 2 times daily (with meals), Disp-180 tablet, R-0Normal      omeprazole (PRILOSEC) 20 MG delayed release capsule TAKE 1 CAPSULE BY MOUTH ONCE DAILY BEFORE BREAKFAST, R-3Historical Med         STOP taking these medications       dicyclomine (BENTYL) 10 MG capsule Comments:   Reason for Stopping: Your information:  Name: Leanna Manzano  : 1960    Your instructions:  Discharge Home with [place] supplies:  coloplast 2 piece system wafer #[id number]  coloplast pouch #[id number]  Ostomy paste and powder  Brava strips [id number]    What to do after you leave the hospital:    Recommended diet: Low Fiber Diet  Low-Fiber Diet: Care Instructions  Overview  When your bowels are irritated, you may need to limit fiber in your diet until the problem clears up. Your doctor and dietitian can help you design a low-fiber diet based on your health and what you prefer to eat. Ask your doctor how long you should stay on a low-fiber diet. Your doctor probably will have you start adding more fiber to your diet as you get better. Always talk with your doctor or dietitian before you make changes in your diet. Follow-up care is a key part of your treatment and safety. Be sure to make and go to all appointments, and call your doctor if you are having problems. It's also a good idea to know your test results and keep a list of the medicines you take. How can you care for yourself at home? · Choose white or refined grains, and avoid whole grains. That means eating white or refined cereals, breads, crackers, rice, or pasta.   · Choose fruits and vegetables that have been peeled and cooked. Avoid fruits and vegetables that are raw or that have skins, seeds, or hulls. ? Eat cooked or canned fruit. Low-fiber fruits include applesauce, canned peaches, canned pears, and fruit juice without pulp. ? Eat low-fiber vegetables, which include well-cooked vegetables and vegetable juice. · Drink or eat milk, yogurt, or other milk products if you can digest dairy without too many problems. Your doctor may limit milk and milk products for a while. If so, they may recommend a calcium and vitamin D supplement. · Eat well-cooked meat, such as chicken, turkey, fish, or lean meat. You also can eat eggs and tofu. · Avoid these foods:  ? Bran, brown or wild rice, oatmeal, granola, corn, isai crackers, barley, and whole wheat and other whole-grain breads, such as rye bread  ? Cereals with more than 2 grams of fiber a serving  ? Berries, rhubarb, prunes, prune juice, and all dried fruits  ? Raw vegetables and fruits  ? Foods that may cause gas, such as raw or cooked cabbage, broccoli, brussels sprouts, and cauliflower  ? Cooked dried beans, lentils, and split peas  ? Crunchy peanut butter  ? Ice cream with fruit pieces in it  ? Coconut, nuts, popcorn, raisins, and seeds, or any ice cream, yogurt, or cheese with these in them    Recommended activity:  · Rest when you feel tired. Getting enough sleep will help you recover. · Try to walk each day. Start by walking a little more than you did the day before. Bit by bit, increase the amount you walk. Walking boosts blood flow and helps prevent pneumonia and constipation. · Avoid strenuous activities, such as biking, jogging, weight lifting, or aerobic exercise, until your doctor says it is okay. · Ask your doctor when you can drive again. · You will probably need to take 3 to 4 weeks off from work. It depends on the type of work you do and how you feel. You may need to take off 4 to 6 weeks if you lift heavy objects in your job.    · You may shower 24 to 48 hours after surgery, if your doctor says it is okay. Pat the cut (incision) dry. Do not take a bath for the first 2 weeks, or until your doctor tells you it is okay. · Ask your doctor when it is okay for you to have sex. The following personal items were collected during your admission and were returned to you:    Belongings  Dental Appliances: None  Vision - Corrective Lenses: None  Hearing Aid: None  Jewelry: None  Body Piercings Removed: N/A  Clothing: Melania Rahat / coat,Pants,Shirt,Socks,Undergarments (Comment)  Were All Patient Medications Collected?: Not Applicable  Home Medications: None  Other Valuables: Cell phone,Purse  Valuables Given To: Patient  Responsible person(s) in the waiting room: joselyn  Patient approves for provider to speak to responsible person post operatively: Yes    Information obtained by:  By signing below, I understand that if any problems occur once I leave the hospital I am to contact PCP. I understand and acknowledge receipt of the instructions indicated above. Caring for Your Stoma and Ostomy Bag (02:54)  Your health professional recommends that you watch this short online health video. Learn about stomas and ostomy bags and how to take care of them. Purpose:  Describes stomas and ostomy bags. Includes general information on emptying the bag and changing the skin barrier. Goal:  The user will learn about having a stoma and using an ostomy bag. How to watch the video    Scan the QR code   OR Visit the website    [email or web address]   Current as of: October 6, 2021               Content Version: 13.1  © 2006-2021 Healthwise, Incorporated. Care instructions adapted under license by [facility] ([facility]). If you have questions about a medical condition or this instruction, always ask your healthcare professional. [place] [address] any warranty or liability for your use of this information.         Ostomy Care: Care Instructions  Your Care Instructions     When a part of your intestine doesn't work as it should, a doctor can do surgery to make an opening in your belly and bring a part of your intestine to the surface of your skin. This opening is called an ostomy. There are two types. A colostomy is an ostomy of the colon. An ileostomy is an ostomy of the small intestine. With an ostomy, waste no longer leaves your body from your anus. It leaves your body through the part of your intestine at the ostomy opening. This part of the intestine is called the stoma. There's no muscle around the stoma. So you can't control when waste or gas leaves your body. Now your waste automatically goes from the stoma into a plastic bag (pouch) around the stoma. This pouch will block the smell of the waste. It can't be seen when you are wearing clothes. You can learn to take care of your ostomy. Good care can make living with a stoma easier. It can help keep a good seal between the skin and the pouch. This can prevent your skin from getting irritated. Follow-up care is a key part of your treatment and safety. Be sure to make and go to all appointments, and call your doctor if you are having problems. It's also a good idea to know your test results and keep a list of the medicines you take. How can you care for yourself at home? · If the skin under your pouch is red, irritated, or itchy, you need to treat your skin. Follow these steps:  ? Gently remove the pouch. ? Clean the skin under the pouch with water. ? Dry the skin. ? Sprinkle ostomy powder on the skin. Then gently wipe off the extra powder. ? Reattach or replace the pouch. · If you continue to have skin irritation, talk to your ostomy nurse. · Follow all instructions from your ostomy nurse. · Empty and replace your ostomy pouch as often as your nurse recommends. · Be safe with medicines. Take your medicines exactly as prescribed.  Call your doctor if you think you are having a problem with your

## 2022-02-22 ENCOUNTER — OFFICE VISIT (OUTPATIENT)
Dept: SURGERY | Age: 62
End: 2022-02-22

## 2022-02-22 VITALS
SYSTOLIC BLOOD PRESSURE: 138 MMHG | BODY MASS INDEX: 30.99 KG/M2 | DIASTOLIC BLOOD PRESSURE: 87 MMHG | TEMPERATURE: 98 F | WEIGHT: 186 LBS | HEART RATE: 86 BPM | HEIGHT: 65 IN

## 2022-02-22 DIAGNOSIS — L53.9 ERYTHEMA: Primary | ICD-10-CM

## 2022-02-22 DIAGNOSIS — Z09 POSTOPERATIVE EXAMINATION: ICD-10-CM

## 2022-02-22 PROCEDURE — 99024 POSTOP FOLLOW-UP VISIT: CPT | Performed by: SURGERY

## 2022-02-22 RX ORDER — SULFAMETHOXAZOLE AND TRIMETHOPRIM 800; 160 MG/1; MG/1
1 TABLET ORAL 2 TIMES DAILY
Qty: 14 TABLET | Refills: 0 | Status: SHIPPED | OUTPATIENT
Start: 2022-02-22 | End: 2022-03-01

## 2022-02-22 NOTE — PROGRESS NOTES
General Surgery Office Note  [facility]  Consandre EMETERIO Fuller MD    Patient's Name/Date of Birth: Carlito Ignacio / 1960    Date: February 22, 2022     Surgeon: Barrett Fuller MD    Chief Complaint:   Chief Complaint   Patient presents with    Wound Check       Patient Active Problem List   Diagnosis    Right renal mass    Renal cell carcinoma of right kidney [facility])    Essential hypertension    Smoker    Anxiety    Anemia    Mass of colon       Subjective: Has had some drainage from colostomy site with some skin excoriation. Eating and drinking well. No bowel complaints. Objective:  BP (!) 145/105   Pulse 101   Temp 98 °F (36.7 °C) (Temporal)   Ht 5' 5\" (1.651 m)   Wt 186 lb (84.4 kg)   BMI 30.95 kg/m²   Labs:  No results for input(s): WBC, HGB, HCT in the last 72 hours. Invalid input(s): PLR  Lab Results   Component Value Date    CREATININE 0.6 02/17/2022    BUN 8 02/17/2022     02/17/2022    K 4.3 02/17/2022     02/17/2022    CO2 24 02/17/2022     No results for input(s): LIPASE, AMYLASE in the last 72 hours. General appearance: AA, NAD  HEENT: NCAT, PERRLA, EOMI  Lungs: Clear, equal rise bilateral  Heart: Reg  Abdomen: soft, nondistended, nontender. Mild erythema in the midline around staples. Erythema lateral to the colostomy. Colostomy in place with stool. Skin: No lesions, incisions well healed  Psych: No distress, conversive, no hallucinations  : No ulcers or lesions  Rectal: No bleeding    A complete 10 system review was performed and are otherwise negative unless mentioned in the above HPI. Specific negatives are listed below but may not include all those reviewed.     General ROS: negative obtundation, AMS  ENT ROS: negative rhinorrhea, epistaxis  Allergy and Immunology ROS: negative itchy/watery eyes or nasal congestion  Hematological and Lymphatic ROS: negative spontaneous bleeding or bruising  Endocrine ROS: negative  lethargy, mood swings, palpitations or polydipsia/polyuria  Respiratory ROS: negative sputum changes, stridor, tachypnea or wheezing  Cardiovascular ROS: negative for - loss of consciousness, murmur or orthopnea  Gastrointestinal ROS: negative for - hematochezia or hematemesis  Genito-Urinary ROS: negative for -  genital discharge or hematuria  Musculoskeletal ROS: negative for - focal weakness, gangrene  Psych/Neuro ROS: negative for - visual or auditory hallucinations, suicidal ideation      Time spent reviewing past medical, surgical, social and family history, vitals, nursing assessment and images.     Imaging:  n/a    Pathology: pending    Assessment/Plan:  Edu Parekh is a 57 y.o. female 1 week status post laparoscopic converted to open sigmoid colectomy with end colostomy for sigmoid colon mass    Doing well overall however having some colostomy and wound complications  Bactrim 7 days for erythema  Consult stoma nurse  Follow-up in 1 week  Await path    Physician Signature: Electronically signed by Dr. José Luis Vargas  2/22/2022

## 2022-03-01 ENCOUNTER — OFFICE VISIT (OUTPATIENT)
Dept: SURGERY | Age: 62
End: 2022-03-01

## 2022-03-01 VITALS
HEIGHT: 65 IN | WEIGHT: 186 LBS | SYSTOLIC BLOOD PRESSURE: 161 MMHG | BODY MASS INDEX: 30.99 KG/M2 | TEMPERATURE: 98.1 F | DIASTOLIC BLOOD PRESSURE: 95 MMHG | HEART RATE: 83 BPM

## 2022-03-01 DIAGNOSIS — Z09 POSTOPERATIVE EXAMINATION: ICD-10-CM

## 2022-03-01 DIAGNOSIS — R19.7 DIARRHEA, UNSPECIFIED TYPE: Primary | ICD-10-CM

## 2022-03-01 PROCEDURE — 99024 POSTOP FOLLOW-UP VISIT: CPT | Performed by: SURGERY

## 2022-03-01 NOTE — PROGRESS NOTES
General Surgery Office Note  [facility]  Consandre P. Bing Doyle MD    Patient's Name/Date of Birth: Luba Groves / 1960    Date: March 1, 2022     Surgeon: Bing Doyle MD    Chief Complaint:   Chief Complaint   Patient presents with   Jones Wound Check    Suture / Staple Removal       Patient Active Problem List   Diagnosis    Right renal mass    Renal cell carcinoma of right kidney ([address]    Essential hypertension    Smoker    Anxiety    Anemia    Mass of colon       Subjective: Having persistent leakage of liquid stool which is causing the colostomy appliance to fall off. Colostomy appliance in abdominal crease. Objective:  BP (!) 161/95   Pulse 83   Temp 98.1 °F (36.7 °C) (Temporal)   Ht 5' 5\" (1.651 m)   Wt 186 lb (84.4 kg)   BMI 30.95 kg/m²   Labs:  No results for input(s): WBC, HGB, HCT in the last 72 hours. Invalid input(s): PLR  Lab Results   Component Value Date    CREATININE 0.6 02/17/2022    BUN 8 02/17/2022     02/17/2022    K 4.3 02/17/2022     02/17/2022    CO2 24 02/17/2022     No results for input(s): LIPASE, AMYLASE in the last 72 hours. General appearance: AA, NAD  HEENT: NCAT, PERRLA, EOMI  Lungs: Clear, equal rise bilateral  Heart: Reg  Abdomen: soft, nondistended, nontender. Mild erythema in the midline around staples. Staples removed. Erythema lateral to the colostomy appliance improving. Colostomy in place with stool. Skin: No lesions, incisions well healed  Psych: No distress, conversive, no hallucinations  : No ulcers or lesions  Rectal: No bleeding    A complete 10 system review was performed and are otherwise negative unless mentioned in the above HPI. Specific negatives are listed below but may not include all those reviewed.     General ROS: negative obtundation, AMS  ENT ROS: negative rhinorrhea, epistaxis  Allergy and Immunology ROS: negative itchy/watery eyes or nasal congestion  Hematological and Lymphatic ROS: negative spontaneous bleeding or bruising  Endocrine ROS: negative  lethargy, mood swings, palpitations or polydipsia/polyuria  Respiratory ROS: negative sputum changes, stridor, tachypnea or wheezing  Cardiovascular ROS: negative for - loss of consciousness, murmur or orthopnea  Gastrointestinal ROS: negative for - hematochezia or hematemesis  Genito-Urinary ROS: negative for -  genital discharge or hematuria  Musculoskeletal ROS: negative for - focal weakness, gangrene  Psych/Neuro ROS: negative for - visual or auditory hallucinations, suicidal ideation      Time spent reviewing past medical, surgical, social and family history, vitals, nursing assessment and images. Imaging:  n/a    Pathology:     Diagnosis:   Portion of sigmoid colon, excision:   Diverticular disease/diverticulitis with suppurative and organizing   diverticular and peridiverticular abscess formation and mucosal   ulceration in region of grossly identified colonic stricture. Fibrovascular serosal adhesions, chronic serositis, serosal hemorrhage   and focal mesothelial hyperplasia, see comment. Six (6) benign pericolic lymph nodes with reactive lymphoid follicular   hyperplasia.      Assessment/Plan:  Alba Oneil is a 57 y.o. female 2 week status post laparoscopic converted to open sigmoid colectomy with end colostomy for sigmoid colon mass -chronic diverticulitis on pathology    Doing well overall however having persistent colostomy complications  We will supplement fiber to increase bulk of school and decrease the likelihood that patient will have liquid stool leaking around the colostomy appliance  Patient reluctant to see stoma nurse  Follow-up in 3 months    Physician Signature: Electronically signed by Dr. Bowman Letters  3/1/2022

## 2022-03-14 ENCOUNTER — TELEPHONE (OUTPATIENT)
Dept: SURGERY | Age: 62
End: 2022-03-14

## 2022-03-14 DIAGNOSIS — Z71.89 OSTOMY NURSE CONSULTATION: Primary | ICD-10-CM

## 2022-03-14 NOTE — TELEPHONE ENCOUNTER
Patient phoned the office to report that she is going through multiple bags a day and that her bags are not sealing. Patient informed that we will need to refer her to the Ostomy Nurse at [facility] for further evaluation and education. Patient initially refused referral.      After several minutes of discuss and explaining to her what would happen during appointment, patient agreed to referral.    Patient informed that I have access to give her one bag and she will have someone come and pick it up for her. Referral placed and send to Ostomy Nurse.   Electronically signed by Je Roper on 3/14/22 at 9:57 AM EDT

## 2022-03-17 ENCOUNTER — TELEPHONE (OUTPATIENT)
Dept: SURGERY | Age: 62
End: 2022-03-17

## 2022-03-17 NOTE — TELEPHONE ENCOUNTER
Referral was placed for Ostomy Nurse at [address], but when called for appointment, patient stated that she does not want to be seen there and would like to be referred to a new Ostomy Nurse/Dr.    Message charted, Dr Bing Doyle and Marshal HENDRIX notified.

## 2022-03-18 ENCOUNTER — TELEPHONE (OUTPATIENT)
Dept: SURGERY | Age: 62
End: 2022-03-18

## 2022-03-18 NOTE — TELEPHONE ENCOUNTER
Patient called office stating that she is not getting ostomy supplies. [address], contacted in regards to ostomy supplies. RN states that he has ordered patients ostomy supplies from [employer]. Orders to be signed when faxed over.    Electronically signed by Becca Gamboa RN on 3/18/2022 at 11:23 AM    Iban Bowman RN   [phone]

## 2022-06-23 ENCOUNTER — OFFICE VISIT (OUTPATIENT)
Dept: SURGERY | Age: 62
End: 2022-06-23
Payer: MEDICAID

## 2022-06-23 VITALS
BODY MASS INDEX: 30.99 KG/M2 | DIASTOLIC BLOOD PRESSURE: 73 MMHG | HEIGHT: 65 IN | WEIGHT: 186 LBS | TEMPERATURE: 97.5 F | SYSTOLIC BLOOD PRESSURE: 147 MMHG | HEART RATE: 78 BPM

## 2022-06-23 DIAGNOSIS — Z76.89 ENCOUNTER TO ESTABLISH CARE: Primary | ICD-10-CM

## 2022-06-23 DIAGNOSIS — Z93.3 COLOSTOMY IN PLACE (HCC): Primary | ICD-10-CM

## 2022-06-23 PROCEDURE — 99214 OFFICE O/P EST MOD 30 MIN: CPT | Performed by: SURGERY

## 2022-06-23 PROCEDURE — G8427 DOCREV CUR MEDS BY ELIG CLIN: HCPCS | Performed by: SURGERY

## 2022-06-23 PROCEDURE — G8417 CALC BMI ABV UP PARAM F/U: HCPCS | Performed by: SURGERY

## 2022-06-23 PROCEDURE — 3017F COLORECTAL CA SCREEN DOC REV: CPT | Performed by: SURGERY

## 2022-06-23 PROCEDURE — 4004F PT TOBACCO SCREEN RCVD TLK: CPT | Performed by: SURGERY

## 2022-06-23 RX ORDER — ERYTHROMYCIN 500 MG/1
500 TABLET, COATED ORAL 3 TIMES DAILY
Qty: 3 TABLET | Refills: 0 | Status: SHIPPED | OUTPATIENT
Start: 2022-06-23 | End: 2022-06-24

## 2022-06-23 RX ORDER — NEOMYCIN SULFATE 500 MG/1
1000 TABLET ORAL SEE ADMIN INSTRUCTIONS
Qty: 6 TABLET | Refills: 0 | Status: SHIPPED | OUTPATIENT
Start: 2022-06-23 | End: 2022-06-24

## 2022-06-23 NOTE — PROGRESS NOTES
General Surgery History and Physical    Patient's Name/Date of Birth: Terri Azul / 1960    Date: June 23, 2022     Surgeon: Nina Cordova M.D.    PCP: No primary care provider on file. Chief Complaint: colostomy in place    HPI:   Terri Azul is a 57 y.o. female who presents for evaluation of colostomy in place and wants reversal.       Past Medical History:   Diagnosis Date    Anxiety     Cancer ([address] 2016    Kidney    Irritable bowel syndrome     Obesity        Past Surgical History:   Procedure Laterality Date    COLECTOMY N/A 2/15/2022    LAPAROSCOPIC CONVERTED TO OPEN SIGMOID COLECTOMY WITH END COLOSTOMY performed by Gustavus Scheuermann, MD at [place] [address] Right     Mass removed at [facility]. E's     TONSILLECTOMY         Current Outpatient Medications   Medication Sig Dispense Refill    neomycin (MYCIFRADIN) 500 MG tablet Take 2 tablets by mouth See Admin Instructions for 1 day Take at 1300, 1400 and midnight the day before surgery 6 tablet 0    erythromycin base (E-MYCIN) 500 MG tablet Take 1 tablet by mouth 3 times daily for 1 day Take at 1300, 1400 and midnight the day before surgery, use these instructions instead of the ones above. 3 tablet 0    ondansetron (ZOFRAN-ODT) 4 MG disintegrating tablet Take 1 tablet by mouth 3 times daily as needed for Nausea or Vomiting (Patient not taking: Reported on 2/1/2022) 21 tablet 0    triamterene-hydrochlorothiazide (MAXZIDE-25) 37.5-25 MG per tablet Take 1 tablet by mouth daily (Patient not taking: Reported on 10/5/2021) 90 tablet 0    carvedilol (COREG) 6.25 MG tablet Take 1 tablet by mouth 2 times daily (with meals) (Patient not taking: Reported on 10/5/2021) 180 tablet 0    omeprazole (PRILOSEC) 20 MG delayed release capsule TAKE 1 CAPSULE BY MOUTH ONCE DAILY BEFORE BREAKFAST (Patient not taking: Reported on 6/23/2022)  3     No current facility-administered medications for this visit.        Allergies Allergen Reactions    Penicillins Hives     ITCHING    Tylenol [Acetaminophen] Itching    Nuts [Peanut-Containing Drug Products] Hives     ALL NUTS- PISTACHIOS       The patient has a family history that is negative for severe cardiovascular or respiratory issues, negative for reaction to anesthesia. Social History     Socioeconomic History    Marital status:      Spouse name: Not on file    Number of children: Not on file    Years of education: Not on file    Highest education level: Not on file   Occupational History    Not on file   Tobacco Use    Smoking status: Current Every Day Smoker     Packs/day: 0.50     Years: 40.00     Pack years: 20.00     Types: Cigarettes    Smokeless tobacco: Never Used    Tobacco comment: SHE IS TRYING   Substance and Sexual Activity    Alcohol use: No    Drug use: No    Sexual activity: Not on file   Other Topics Concern    Not on file   Social History Narrative    Not on file     Social Determinants of Health     Financial Resource Strain:     Difficulty of Paying Living Expenses: Not on file   Food Insecurity:     Worried About Running Out of Food in the Last Year: Not on file    Clyde of Food in the Last Year: Not on file   Transportation Needs:     Lack of Transportation (Medical): Not on file    Lack of Transportation (Non-Medical):  Not on file   Physical Activity:     Days of Exercise per Week: Not on file    Minutes of Exercise per Session: Not on file   Stress:     Feeling of Stress : Not on file   Social Connections:     Frequency of Communication with Friends and Family: Not on file    Frequency of Social Gatherings with Friends and Family: Not on file    Attends [employer] Services: Not on file    Active Member of Clubs or Organizations: Not on file    Attends Club or Organization Meetings: Not on file    Marital Status: Not on file   Intimate Partner Violence:     Fear of Current or Ex-Partner: Not on file   [employer] Abused: Not on file    Physically Abused: Not on file    Sexually Abused: Not on file   Housing Stability:     Unable to Pay for Housing in the Last Year: Not on file    Number of Places Lived in the Last Year: Not on file    Unstable Housing in the Last Year: Not on file           Review of Systems  Review of Systems -  General ROS: negative for - chills, fatigue or malaise  ENT ROS: negative for - hearing change, nasal congestion or nasal discharge  Allergy and Immunology ROS: negative for - hives, itchy/watery eyes or nasal congestion  Hematological and Lymphatic ROS: negative for - blood clots, blood transfusions, bruising or fatigue  Endocrine ROS: negative for - malaise/lethargy, mood swings, palpitations or polydipsia/polyuria  Respiratory ROS: negative for - sputum changes, stridor, tachypnea or wheezing  Cardiovascular ROS: negative for - irregular heartbeat, loss of consciousness, murmur or orthopnea  Gastrointestinal ROS: negative for - constipation, diarrhea, gas/bloating, heartburn or hematemesis  Genito-Urinary ROS: negative for -  genital discharge, genital ulcers or hematuria  Musculoskeletal ROS: negative for - gait disturbance, muscle pain or muscular weakness    Physical exam:   BP (!) 147/73   Pulse 78   Temp 97.5 °F (36.4 °C) (Temporal)   Ht 5' 5\" (1.651 m)   Wt 186 lb (84.4 kg)   BMI 30.95 kg/m²   General appearance:  NAD  Head: NCAT, PERRLA, EOMI, red conjunctiva  Neck: supple, no masses  Lungs: CTAB, equal chest rise bilateral  Heart: Reg rate  Abdomen: soft, nontender, nondistended  Skin; no lesions  Gu: no cva tenderness  Extremities: extremities normal, atraumatic, no cyanosis or edema      Assessment:  57 y.o. female with colostomy in place    Plan:   Will plan for lap possible open reversal possible ostomy  Discussed the risk, benefits and alternatives of surgery including wound infections, bleeding, scar and hernia formation and the risks of general anesthetic including MI,

## 2022-07-11 ENCOUNTER — OFFICE VISIT (OUTPATIENT)
Dept: PRIMARY CARE CLINIC | Age: 62
End: 2022-07-11
Payer: MEDICAID

## 2022-07-11 VITALS
SYSTOLIC BLOOD PRESSURE: 135 MMHG | TEMPERATURE: 98.5 F | OXYGEN SATURATION: 96 % | HEIGHT: 65 IN | WEIGHT: 199 LBS | DIASTOLIC BLOOD PRESSURE: 75 MMHG | HEART RATE: 81 BPM | BODY MASS INDEX: 33.15 KG/M2

## 2022-07-11 DIAGNOSIS — F41.9 ANXIETY: ICD-10-CM

## 2022-07-11 DIAGNOSIS — I44.4 LEFT ANTERIOR FASCICULAR BLOCK: ICD-10-CM

## 2022-07-11 DIAGNOSIS — K57.90 DIVERTICULOSIS: Primary | ICD-10-CM

## 2022-07-11 DIAGNOSIS — K57.90 DIVERTICULOSIS: ICD-10-CM

## 2022-07-11 LAB
ALBUMIN SERPL-MCNC: 4.3 G/DL (ref 3.5–5.2)
ALP BLD-CCNC: 95 U/L (ref 35–104)
ALT SERPL-CCNC: 16 U/L (ref 0–32)
ANION GAP SERPL CALCULATED.3IONS-SCNC: 11 MMOL/L (ref 7–16)
AST SERPL-CCNC: 18 U/L (ref 0–31)
BASOPHILS ABSOLUTE: 0.04 E9/L (ref 0–0.2)
BASOPHILS RELATIVE PERCENT: 0.5 % (ref 0–2)
BILIRUB SERPL-MCNC: 0.3 MG/DL (ref 0–1.2)
BUN BLDV-MCNC: 15 MG/DL (ref 6–23)
CALCIUM SERPL-MCNC: 9.6 MG/DL (ref 8.6–10.2)
CHLORIDE BLD-SCNC: 107 MMOL/L (ref 98–107)
CO2: 24 MMOL/L (ref 22–29)
CREAT SERPL-MCNC: 0.7 MG/DL (ref 0.5–1)
EOSINOPHILS ABSOLUTE: 0.21 E9/L (ref 0.05–0.5)
EOSINOPHILS RELATIVE PERCENT: 2.9 % (ref 0–6)
GFR AFRICAN AMERICAN: >60
GFR NON-AFRICAN AMERICAN: >60 ML/MIN/1.73
GLUCOSE BLD-MCNC: 81 MG/DL (ref 74–99)
HCT VFR BLD CALC: 44.1 % (ref 34–48)
HEMOGLOBIN: 13.8 G/DL (ref 11.5–15.5)
IMMATURE GRANULOCYTES #: 0.03 E9/L
IMMATURE GRANULOCYTES %: 0.4 % (ref 0–5)
LYMPHOCYTES ABSOLUTE: 2.61 E9/L (ref 1.5–4)
LYMPHOCYTES RELATIVE PERCENT: 35.6 % (ref 20–42)
MCH RBC QN AUTO: 29.2 PG (ref 26–35)
MCHC RBC AUTO-ENTMCNC: 31.3 % (ref 32–34.5)
MCV RBC AUTO: 93.2 FL (ref 80–99.9)
MONOCYTES ABSOLUTE: 0.83 E9/L (ref 0.1–0.95)
MONOCYTES RELATIVE PERCENT: 11.3 % (ref 2–12)
NEUTROPHILS ABSOLUTE: 3.61 E9/L (ref 1.8–7.3)
NEUTROPHILS RELATIVE PERCENT: 49.3 % (ref 43–80)
PDW BLD-RTO: 15.1 FL (ref 11.5–15)
PLATELET # BLD: 196 E9/L (ref 130–450)
PMV BLD AUTO: 11.2 FL (ref 7–12)
POTASSIUM SERPL-SCNC: 4.2 MMOL/L (ref 3.5–5)
RBC # BLD: 4.73 E12/L (ref 3.5–5.5)
SODIUM BLD-SCNC: 142 MMOL/L (ref 132–146)
TOTAL PROTEIN: 8 G/DL (ref 6.4–8.3)
WBC # BLD: 7.3 E9/L (ref 4.5–11.5)

## 2022-07-11 PROCEDURE — 3017F COLORECTAL CA SCREEN DOC REV: CPT | Performed by: STUDENT IN AN ORGANIZED HEALTH CARE EDUCATION/TRAINING PROGRAM

## 2022-07-11 PROCEDURE — G8427 DOCREV CUR MEDS BY ELIG CLIN: HCPCS | Performed by: STUDENT IN AN ORGANIZED HEALTH CARE EDUCATION/TRAINING PROGRAM

## 2022-07-11 PROCEDURE — 99214 OFFICE O/P EST MOD 30 MIN: CPT | Performed by: STUDENT IN AN ORGANIZED HEALTH CARE EDUCATION/TRAINING PROGRAM

## 2022-07-11 PROCEDURE — G8417 CALC BMI ABV UP PARAM F/U: HCPCS | Performed by: STUDENT IN AN ORGANIZED HEALTH CARE EDUCATION/TRAINING PROGRAM

## 2022-07-11 PROCEDURE — 4004F PT TOBACCO SCREEN RCVD TLK: CPT | Performed by: STUDENT IN AN ORGANIZED HEALTH CARE EDUCATION/TRAINING PROGRAM

## 2022-07-11 RX ORDER — HYDROXYZINE HYDROCHLORIDE 25 MG/1
25 TABLET, FILM COATED ORAL EVERY 8 HOURS PRN
Qty: 60 TABLET | Refills: 0 | Status: SHIPPED | OUTPATIENT
Start: 2022-07-11 | End: 2022-07-31

## 2022-07-11 SDOH — ECONOMIC STABILITY: FOOD INSECURITY: WITHIN THE PAST 12 MONTHS, YOU WORRIED THAT YOUR FOOD WOULD RUN OUT BEFORE YOU GOT MONEY TO BUY MORE.: NEVER TRUE

## 2022-07-11 SDOH — ECONOMIC STABILITY: FOOD INSECURITY: WITHIN THE PAST 12 MONTHS, THE FOOD YOU BOUGHT JUST DIDN'T LAST AND YOU DIDN'T HAVE MONEY TO GET MORE.: NEVER TRUE

## 2022-07-11 ASSESSMENT — PATIENT HEALTH QUESTIONNAIRE - PHQ9
SUM OF ALL RESPONSES TO PHQ QUESTIONS 1-9: 0
1. LITTLE INTEREST OR PLEASURE IN DOING THINGS: 0
2. FEELING DOWN, DEPRESSED OR HOPELESS: 0
SUM OF ALL RESPONSES TO PHQ9 QUESTIONS 1 & 2: 0

## 2022-07-11 ASSESSMENT — ENCOUNTER SYMPTOMS
EYES NEGATIVE: 1
RESPIRATORY NEGATIVE: 1
NAUSEA: 1

## 2022-07-11 ASSESSMENT — SOCIAL DETERMINANTS OF HEALTH (SDOH): HOW HARD IS IT FOR YOU TO PAY FOR THE VERY BASICS LIKE FOOD, HOUSING, MEDICAL CARE, AND HEATING?: NOT HARD AT ALL

## 2022-07-11 NOTE — PROGRESS NOTES
Jace Frey (:  1960) is a 57 y.o. female,New patient, here for evaluation of the following chief complaint(s):  New Patient (She needs a family doctor)         ASSESSMENT/PLAN:  1. Diverticulosis  -     CBC with Auto Differential; Future  -     Comprehensive Metabolic Panel; Future  2. Anxiety  -     hydrOXYzine HCl (ATARAX) 25 MG tablet; Take 1 tablet by mouth every 8 hours as needed for Itching, Disp-60 tablet, R-0Normal      No follow-ups on file. Will order mammogram and discuss pap after surgery    Will try Atarax for anxiety; discussed that I would not want to prescribe xanax or any other benzos given addiction potential of medication and side effects; if anxiety becomes more frequent, would consider daily medication for this    Has Pre eval appointment later this month    Subjective   SUBJECTIVE/OBJECTIVE:  HPI     Patient is a 58 y/o F with a PMHx of RCC s/p resection and chronic diverticulosis s/p colectomy who presents to [facility] care.     She underwent resection of diverticulosis and possible mass which turned out to be benign and had an ostomy placed; plan is to under go reversal of ostomy with Dr. Yasmine Fox next month; denies any bloody output from ostomy; stool output fluctuate between more liquid and hard, pellet like stools    Hx RCC s/p resection or mass; no chemotherapy or immunotherapy; has yearly follow ups    Underwent CT chest in January which showed 4mm RL nodule; will continue to follow; is a current smoker; reports occasional cough but denies SOA, wheezing; considering quitting smoking     Hx left anterior fasicular block; denies any chest pain, palpitations, light headedness, LE edema     Has intermittent episodes of anxiety; had been on PRN xanax in the past for this    FMHx: mother with RCC    SGHx- tonsillectomy; renal mass removal; GI surgeries    Social: currently smoking 1 PPD; denies alcohol or drug use;     Due for pap smear and mammogram      Review of Systems Constitutional: Negative. HENT: Negative. Eyes: Negative. Respiratory: Negative. Cardiovascular: Negative. Gastrointestinal: Positive for nausea. Has an ostomy    Genitourinary: Negative. Musculoskeletal: Negative. Skin: Negative. Psychiatric/Behavioral: Negative. Objective   Physical Exam  Vitals reviewed. Constitutional:       General: She is not in acute distress. Appearance: Normal appearance. HENT:      Head: Normocephalic and atraumatic. Right Ear: Tympanic membrane, ear canal and external ear normal. There is no impacted cerumen. Left Ear: Tympanic membrane, ear canal and external ear normal. There is no impacted cerumen. Mouth/Throat:      Mouth: Mucous membranes are moist.      Pharynx: Oropharynx is clear. No oropharyngeal exudate. Eyes:      General:         Right eye: No discharge. Left eye: No discharge. Cardiovascular:      Rate and Rhythm: Normal rate and regular rhythm. Pulses: Normal pulses. Heart sounds: Normal heart sounds. Pulmonary:      Effort: Pulmonary effort is normal.      Breath sounds: Normal breath sounds. Abdominal:      General: Bowel sounds are normal.      Palpations: Abdomen is soft. Comments: Ostomy in place in LLQ-stoma pink and healthy   Musculoskeletal:      Cervical back: Neck supple. No tenderness. Lymphadenopathy:      Cervical: No cervical adenopathy. Skin:     General: Skin is warm and dry. Neurological:      General: No focal deficit present. Mental Status: She is alert and oriented to person, place, and time. Psychiatric:         Mood and Affect: Mood normal.         Behavior: Behavior normal.                  An electronic signature was used to authenticate this note.     --Nichelle Aviles MD

## 2022-08-02 RX ORDER — ERYTHROMYCIN 500 MG/1
TABLET, COATED ORAL
COMMUNITY
Start: 2022-06-24 | End: 2022-08-03

## 2022-08-03 ENCOUNTER — TELEPHONE (OUTPATIENT)
Dept: SURGERY | Age: 62
End: 2022-08-03

## 2022-08-03 ENCOUNTER — OFFICE VISIT (OUTPATIENT)
Dept: PRIMARY CARE CLINIC | Age: 62
End: 2022-08-03
Payer: MEDICAID

## 2022-08-03 VITALS
TEMPERATURE: 97.8 F | HEART RATE: 72 BPM | WEIGHT: 199.1 LBS | DIASTOLIC BLOOD PRESSURE: 75 MMHG | SYSTOLIC BLOOD PRESSURE: 143 MMHG | BODY MASS INDEX: 33.17 KG/M2 | HEIGHT: 65 IN | OXYGEN SATURATION: 96 %

## 2022-08-03 DIAGNOSIS — Z01.811 PRE-OP CHEST EXAM: Primary | ICD-10-CM

## 2022-08-03 PROCEDURE — 93000 ELECTROCARDIOGRAM COMPLETE: CPT | Performed by: STUDENT IN AN ORGANIZED HEALTH CARE EDUCATION/TRAINING PROGRAM

## 2022-08-03 PROCEDURE — G8417 CALC BMI ABV UP PARAM F/U: HCPCS | Performed by: STUDENT IN AN ORGANIZED HEALTH CARE EDUCATION/TRAINING PROGRAM

## 2022-08-03 PROCEDURE — 99213 OFFICE O/P EST LOW 20 MIN: CPT | Performed by: STUDENT IN AN ORGANIZED HEALTH CARE EDUCATION/TRAINING PROGRAM

## 2022-08-03 PROCEDURE — G8427 DOCREV CUR MEDS BY ELIG CLIN: HCPCS | Performed by: STUDENT IN AN ORGANIZED HEALTH CARE EDUCATION/TRAINING PROGRAM

## 2022-08-03 PROCEDURE — 4004F PT TOBACCO SCREEN RCVD TLK: CPT | Performed by: STUDENT IN AN ORGANIZED HEALTH CARE EDUCATION/TRAINING PROGRAM

## 2022-08-03 PROCEDURE — 3017F COLORECTAL CA SCREEN DOC REV: CPT | Performed by: STUDENT IN AN ORGANIZED HEALTH CARE EDUCATION/TRAINING PROGRAM

## 2022-08-03 ASSESSMENT — ENCOUNTER SYMPTOMS
GASTROINTESTINAL NEGATIVE: 1
RESPIRATORY NEGATIVE: 1

## 2022-08-03 NOTE — TELEPHONE ENCOUNTER
Per the order of Dr. Nichole Acuña, patient has been scheduled for Lap poss open colostomy reversal poss ostomy on 22. Patient instructed to please contact our office with any questions. Medical clearance obtained    Procedure scheduled through [facility]. Dr. Nichole Acuña to enter orders. Prior Authorization Form:      DEMOGRAPHICS:                     Patient Name:  Christine Sweeney  Patient :  1960            Insurance:  Payor: [facility] PL / Plan: [employer] [facility] / Product Type: *No Product type* /   Insurance ID Number:    Payer/Plan Subscr  Sex Relation Sub.  Ins. ID Effective Group Num   1. 40 [facility] [place]* 1960 Female Self [id number] 1/1/15 [employer]                                    [address]         DIAGNOSIS & PROCEDURE:                       Procedure/Operation: Laparoscopic possible open colostomy reversal possible ostomy            CPT Code: 54881    Diagnosis:  colostomy in place    ICD10 Code: Z93.3    Location:  [place] [address]    Surgeon:  Erica London INFORMATION:                          Date: 22    Time: TBD              Anesthesia:  General                                                       Status:  Outpatient        Special Comments:  N/A       Electronically signed by Vinod Munoz LPN on  at 9:76 PM Please return should you have any chest pain,  shortness of breath or any other neurologic symptoms

## 2022-08-03 NOTE — PROGRESS NOTES
Jose Zapata (:  1960) is a 57 y.o. female,Established patient, here for evaluation of the following chief complaint(s):  Pre-op Exam (EKG)         ASSESSMENT/PLAN:  1. Pre-op chest exam  -     EKG 12 Lead    RCRI- 6.6% which is moderate risk; risk is elevated due to nature of the surgery; because patient has no cardiopulmonary symptoms and mets >4, she may proceed to surgery without any further cardiac workup     EKG shows fascicular block and Q waves in anterior leads which has remained unchanged from prior EKGs    Given smoking history, recommend having breathing treatments available post extubation     Return in about 6 weeks (around 2022). Subjective   SUBJECTIVE/OBJECTIVE:  HPI    Patient is a 58 y/o F with a PMHx of complicated diverticulitis who present for per-op exam.    Will be undergoing possible reversal of ostomy with Dr. Jemma Angel on     She denies any chest pain, palpitations, lightheadedness, SOA, orthopnea, PND, LE swelling, wheezing    No hx of CHF, T2DM, CKD and is not on any blood thinners    Do dyspnea on exertion- she can walk up a flight of stairs without any difficulty; had surgery in February and did well with this     Review of Systems   Constitutional: Negative. HENT: Negative. Respiratory: Negative. Cardiovascular: Negative. Gastrointestinal: Negative. Genitourinary: Negative. Skin: Negative. Neurological: Negative. Objective   Physical Exam  Vitals reviewed. Constitutional:       General: She is not in acute distress. Appearance: Normal appearance. HENT:      Head: Normocephalic and atraumatic. Eyes:      General:         Right eye: No discharge. Left eye: No discharge. Cardiovascular:      Rate and Rhythm: Normal rate and regular rhythm. Pulses: Normal pulses. Heart sounds: Normal heart sounds. Pulmonary:      Effort: Pulmonary effort is normal.      Breath sounds: Normal breath sounds. Abdominal:      General: Bowel sounds are normal. There is no distension. Palpations: Abdomen is soft. Skin:     General: Skin is warm and dry. Neurological:      General: No focal deficit present. Mental Status: She is alert and oriented to person, place, and time. Psychiatric:         Mood and Affect: Mood normal.         Behavior: Behavior normal.              An electronic signature was used to authenticate this note.     --Estefani Diaz MD

## 2022-08-04 ENCOUNTER — PREP FOR PROCEDURE (OUTPATIENT)
Dept: SURGERY | Age: 62
End: 2022-08-04

## 2022-08-04 RX ORDER — SODIUM CHLORIDE 9 MG/ML
INJECTION, SOLUTION INTRAVENOUS PRN
Status: CANCELLED | OUTPATIENT
Start: 2022-08-04

## 2022-08-04 RX ORDER — SODIUM CHLORIDE, SODIUM LACTATE, POTASSIUM CHLORIDE, CALCIUM CHLORIDE 600; 310; 30; 20 MG/100ML; MG/100ML; MG/100ML; MG/100ML
INJECTION, SOLUTION INTRAVENOUS CONTINUOUS
Status: CANCELLED | OUTPATIENT
Start: 2022-08-04

## 2022-08-04 RX ORDER — SODIUM CHLORIDE 0.9 % (FLUSH) 0.9 %
5-40 SYRINGE (ML) INJECTION PRN
Status: CANCELLED | OUTPATIENT
Start: 2022-08-04

## 2022-08-04 RX ORDER — SODIUM CHLORIDE 0.9 % (FLUSH) 0.9 %
5-40 SYRINGE (ML) INJECTION EVERY 12 HOURS SCHEDULED
Status: CANCELLED | OUTPATIENT
Start: 2022-08-04

## 2022-08-12 ENCOUNTER — HOSPITAL ENCOUNTER (OUTPATIENT)
Dept: PREADMISSION TESTING | Age: 62
Discharge: HOME | End: 2022-08-12
Payer: MEDICAID

## 2022-08-12 VITALS
SYSTOLIC BLOOD PRESSURE: 130 MMHG | OXYGEN SATURATION: 97 % | BODY MASS INDEX: 31.08 KG/M2 | DIASTOLIC BLOOD PRESSURE: 76 MMHG | HEIGHT: 67 IN | WEIGHT: 198 LBS | HEART RATE: 68 BPM | RESPIRATION RATE: 16 BRPM | TEMPERATURE: 97.4 F

## 2022-08-12 DIAGNOSIS — Z01.818 PRE-OP TESTING: Primary | ICD-10-CM

## 2022-08-12 PROCEDURE — 87081 CULTURE SCREEN ONLY: CPT

## 2022-08-12 NOTE — PROGRESS NOTES
[address] [facility]                                                                                                                    PRE OP [address] [facility]        Date: 8/12/2022    Date of surgery: 8/16/22   Arrival Time: Hospital will call you between 5pm and 7pm with your final arrival time for surgery    Do not  drink anything after midnight prior to surgery. This includes no water, chewing gum, mints or ice chips. Follow bowel prep and antibiotics as ordered by Dr Jenny Kelley    Take the following medications with a small sip of water on the morning of Surgery: none       Aspirin, Ibuprofen, Advil, Naproxen, Vitamin E and other Anti-inflammatory products should be stopped  before surgery  as directed by your physician. Take Tylenol only unless instructed otherwise by your surgeon. Do not smoke,use illicit drugs and do not drink any alcoholic beverages 24 hours prior to surgery. You may brush your teeth the morning of surgery. DO NOT SWALLOW WATER    You MUST make arrangements for a responsible adult to take you home after your surgery. You will not be allowed to leave alone or drive yourself home. It is strongly suggested someone stay with you the first 24 hrs. Your surgery will be cancelled if you do not have a ride home. Please wear simple, loose fitting clothing to the hospital.  Esther Jiménez not bring valuables (money, credit cards, checkbooks, etc.) Do not wear any makeup (including no eye makeup) or nail polish on your fingers or toes. DO NOT wear any jewelry or piercings on day of surgery. All body piercing jewelry must be removed.     Shower the night before surgery with __x_Antibacterial soap /HA WIPES___x_____        Notify your Surgeon if you develop any illness between now and surgery time, cough, cold, fever, sore throat, nausea, vomiting, etc.  Please notify your surgeon if you experience dizziness, shortness of breath or blurred vision between now & the time of your surgery. If you have ___dentures, they will be removed before going to the OR; we will provide you a container. If you wear ___contact lenses or _x__glasses, they will be removed; please bring a case for them. To provide excellent care visitors will be limited to 1  in the room at any given time. During flu season no children under the age of 15 are permitted in the hospital for the safety of all patients. Other  come in main lobby and stop at front. Please call AMBULATORY CARE if you have any further questions.    [address]     [address] [place]

## 2022-08-13 LAB — MRSA CULTURE ONLY: NORMAL

## 2022-09-09 ENCOUNTER — TELEPHONE (OUTPATIENT)
Dept: SURGERY | Age: 62
End: 2022-09-09

## 2022-10-13 ENCOUNTER — PREP FOR PROCEDURE (OUTPATIENT)
Dept: SURGERY | Age: 62
End: 2022-10-13

## 2022-10-13 ENCOUNTER — INITIAL CONSULT (OUTPATIENT)
Dept: SURGERY | Age: 62
End: 2022-10-13
Payer: MEDICAID

## 2022-10-13 ENCOUNTER — TELEPHONE (OUTPATIENT)
Dept: SURGERY | Age: 62
End: 2022-10-13

## 2022-10-13 VITALS — HEIGHT: 66 IN | BODY MASS INDEX: 31.82 KG/M2 | WEIGHT: 198 LBS

## 2022-10-13 DIAGNOSIS — Z93.3 COLOSTOMY IN PLACE (HCC): Primary | ICD-10-CM

## 2022-10-13 PROCEDURE — G8427 DOCREV CUR MEDS BY ELIG CLIN: HCPCS | Performed by: SURGERY

## 2022-10-13 PROCEDURE — G8484 FLU IMMUNIZE NO ADMIN: HCPCS | Performed by: SURGERY

## 2022-10-13 PROCEDURE — G8417 CALC BMI ABV UP PARAM F/U: HCPCS | Performed by: SURGERY

## 2022-10-13 PROCEDURE — 99214 OFFICE O/P EST MOD 30 MIN: CPT | Performed by: SURGERY

## 2022-10-13 PROCEDURE — 4004F PT TOBACCO SCREEN RCVD TLK: CPT | Performed by: SURGERY

## 2022-10-13 PROCEDURE — 3017F COLORECTAL CA SCREEN DOC REV: CPT | Performed by: SURGERY

## 2022-10-13 RX ORDER — SODIUM CHLORIDE 0.9 % (FLUSH) 0.9 %
5-40 SYRINGE (ML) INJECTION PRN
Status: CANCELLED | OUTPATIENT
Start: 2022-10-13

## 2022-10-13 RX ORDER — SODIUM CHLORIDE 0.9 % (FLUSH) 0.9 %
5-40 SYRINGE (ML) INJECTION EVERY 12 HOURS SCHEDULED
Status: CANCELLED | OUTPATIENT
Start: 2022-10-13

## 2022-10-13 RX ORDER — SODIUM CHLORIDE 9 MG/ML
INJECTION, SOLUTION INTRAVENOUS PRN
Status: CANCELLED | OUTPATIENT
Start: 2022-10-13

## 2022-10-13 RX ORDER — SODIUM CHLORIDE, SODIUM LACTATE, POTASSIUM CHLORIDE, CALCIUM CHLORIDE 600; 310; 30; 20 MG/100ML; MG/100ML; MG/100ML; MG/100ML
INJECTION, SOLUTION INTRAVENOUS CONTINUOUS
Status: CANCELLED | OUTPATIENT
Start: 2022-10-13

## 2022-10-13 NOTE — PROGRESS NOTES
General Surgery History and Physical     Patient's Name/Date of Birth: John Knox / 1960     Date: 10/13/2022      Surgeon: Liliane Cortes M.D.     PCP: No primary care provider on file. Chief Complaint: colostomy in place     HPI:   John Knox is a 57 y.o. female who presents for evaluation of colostomy in place and wants reversal.         Past Medical History        Past Medical History:   Diagnosis Date    Anxiety      Cancer ([address] 2016     Kidney    Irritable bowel syndrome      Obesity              Past Surgical History         Past Surgical History:   Procedure Laterality Date    COLECTOMY N/A 2/15/2022     LAPAROSCOPIC CONVERTED TO OPEN SIGMOID COLECTOMY WITH END COLOSTOMY performed by Sophia Galindo MD at [address] Right       Mass removed at [facility]. E's     TONSILLECTOMY                Current Facility-Administered Medications          Current Outpatient Medications   Medication Sig Dispense Refill    neomycin (MYCIFRADIN) 500 MG tablet Take 2 tablets by mouth See Admin Instructions for 1 day Take at 1300, 1400 and midnight the day before surgery 6 tablet 0    erythromycin base (E-MYCIN) 500 MG tablet Take 1 tablet by mouth 3 times daily for 1 day Take at 1300, 1400 and midnight the day before surgery, use these instructions instead of the ones above.  3 tablet 0    ondansetron (ZOFRAN-ODT) 4 MG disintegrating tablet Take 1 tablet by mouth 3 times daily as needed for Nausea or Vomiting (Patient not taking: Reported on 2/1/2022) 21 tablet 0    triamterene-hydrochlorothiazide (MAXZIDE-25) 37.5-25 MG per tablet Take 1 tablet by mouth daily (Patient not taking: Reported on 10/5/2021) 90 tablet 0    carvedilol (COREG) 6.25 MG tablet Take 1 tablet by mouth 2 times daily (with meals) (Patient not taking: Reported on 10/5/2021) 180 tablet 0    omeprazole (PRILOSEC) 20 MG delayed release capsule TAKE 1 CAPSULE BY MOUTH ONCE DAILY BEFORE BREAKFAST (Patient not taking: Reported on 6/23/2022)   3      No current facility-administered medications for this visit. Allergies   Allergen Reactions    Penicillins Hives       ITCHING    Tylenol [Acetaminophen] Itching    Nuts [Peanut-Containing Drug Products] Hives       ALL NUTS- PISTACHIOS         The patient has a family history that is negative for severe cardiovascular or respiratory issues, negative for reaction to anesthesia. Social History               Socioeconomic History    Marital status:        Spouse name: Not on file    Number of children: Not on file    Years of education: Not on file    Highest education level: Not on file   Occupational History    Not on file   Tobacco Use    Smoking status: Current Every Day Smoker       Packs/day: 0.50       Years: 40.00       Pack years: 20.00       Types: Cigarettes    Smokeless tobacco: Never Used    Tobacco comment: SHE IS TRYING   Substance and Sexual Activity    Alcohol use: No    Drug use: No    Sexual activity: Not on file   Other Topics Concern    Not on file   Social History Narrative    Not on file      Social Determinants of Health          Financial Resource Strain:     Difficulty of Paying Living Expenses: Not on file   Food Insecurity:     Worried About [address] [employer] in the Last Year: Not on file    Ran Out of Food in the Last Year: Not on file   Transportation Needs:     Lack of Transportation (Medical): Not on file    Lack of Transportation (Non-Medical):  Not on file   Physical Activity:     Days of Exercise per Week: Not on file    Minutes of Exercise per Session: Not on file   Stress:     Feeling of Stress : Not on file   Social Connections:     Frequency of Communication with Friends and Family: Not on file    Frequency of Social Gatherings with Friends and Family: Not on file    Attends [employer] Services: Not on file    Active Member of Clubs or Organizations: Not on file    Attends Club or Organization Meetings: Not on file    Marital Status: Not on file   Intimate Partner Violence:     Fear of Current or Ex-Partner: Not on file    Emotionally Abused: Not on file    Physically Abused: Not on file    Sexually Abused: Not on file   Housing Stability:     Unable to Pay for Housing in the Last Year: Not on file    Number of [place] in the Last Year: Not on file    Unstable Housing in the Last Year: Not on file                  Review of Systems  Review of Systems -  General ROS: negative for - chills, fatigue or malaise  ENT ROS: negative for - hearing change, nasal congestion or nasal discharge  Allergy and Immunology ROS: negative for - hives, itchy/watery eyes or nasal congestion  Hematological and Lymphatic ROS: negative for - blood clots, blood transfusions, bruising or fatigue  Endocrine ROS: negative for - malaise/lethargy, mood swings, palpitations or polydipsia/polyuria  Respiratory ROS: negative for - sputum changes, stridor, tachypnea or wheezing  Cardiovascular ROS: negative for - irregular heartbeat, loss of consciousness, murmur or orthopnea  Gastrointestinal ROS: negative for - constipation, diarrhea, gas/bloating, heartburn or hematemesis  Genito-Urinary ROS: negative for -  genital discharge, genital ulcers or hematuria  Musculoskeletal ROS: negative for - gait disturbance, muscle pain or muscular weakness     Physical exam:   Ht 5' 6\" (1.676 m)   Wt 198 lb (89.8 kg)   BMI 31.96 kg/m²    General appearance:  NAD  Head: NCAT, PERRLA, EOMI, red conjunctiva  Neck: supple, no masses  Lungs: CTAB, equal chest rise bilateral  Heart: Reg rate  Abdomen: soft, nontender, nondistended  Skin; no lesions  Gu: no cva tenderness  Extremities: extremities normal, atraumatic, no cyanosis or edema        Assessment:  57 y.o. female with colostomy in place     Plan:   Will plan for lap possible open reversal possible ostomy  Discussed the risk, benefits and alternatives of surgery including wound infections, bleeding, scar and hernia formation and the risks of general anesthetic including MI, CVA, sudden death or reactions to anesthetic medications. The patient understands the risks and alternatives and the possibility of converting to an open procedure. All questions were answered to the patient's satisfaction and they freely signed the consent.         Janette Medrano MD

## 2022-10-13 NOTE — TELEPHONE ENCOUNTER
Per the order of Dr. Xin Buck, patient has been scheduled for Laparoscopic possible open colostomy reversal possible ostomy on 22. Patient instructed to please contact our office with any questions. Bowel prep given and explained    Surgery scheduled through [facility]. Dr. Xin Buck to enter orders. Prior Authorization Form:      DEMOGRAPHICS:                     Patient Name:  Wilder Salgado  Patient :  1960            Insurance:  Payor: [facility] PL / Plan: [facility] [employer] / Product Type: *No Product type* /   Insurance ID Number:    Payer/Plan Subscr  Sex Relation Sub.  Ins. ID Effective Group Num   1. 40 [facility] [place]* 1960 Female Self [id number] 1/1/15 [employer]                                   [address]         DIAGNOSIS & PROCEDURE:                       Procedure/Operation: Laparoscopic possible open colostomy reversal possible ostomy           CPT Code: 47963    Diagnosis:  colostomy in place    ICD10 Code: Z93.3    Location:  [place] [address]    Surgeon:  TBD    SCHEDULING INFORMATION:                          Date: 22    Time: TBD              Anesthesia:  General                                                       Status:  Outpatient        Special Comments:  N/A       Electronically signed by Stephanie Ferreira LPN on  at 11:29 AM

## 2022-10-17 ENCOUNTER — HOSPITAL ENCOUNTER (OUTPATIENT)
Dept: PREADMISSION TESTING | Age: 62
Discharge: HOME | End: 2022-10-17

## 2022-10-17 ENCOUNTER — TELEPHONE (OUTPATIENT)
Dept: SURGERY | Age: 62
End: 2022-10-17

## 2022-10-17 DIAGNOSIS — Z01.818 PRE-OP TESTING: Primary | ICD-10-CM

## 2022-10-17 NOTE — TELEPHONE ENCOUNTER
Patient called in to reschedule surgery. She currently has a sinus infection. Surgery rescheduled for 11/2/22. Schedulers and Dr Gregory Shelby made aware.

## 2022-11-01 ENCOUNTER — TELEPHONE (OUTPATIENT)
Dept: SURGERY | Age: 62
End: 2022-11-01

## 2022-11-01 NOTE — TELEPHONE ENCOUNTER
Patient called office requesting to cancel lap possible open colostomy reversal scheduled with Dr. Margi Davis on 11/2/22. No surgery cancellation reason provided by patient, she states that she will call back to reschedule. Surgery scheduling notified of cancellation, follow up appointment cancelled.    Electronically signed by Flower Bañuelos RN on 11/1/2022 at 8:43 AM

## 2025-09-03 DIAGNOSIS — Z93.3 COLOSTOMY PRESENT (HCC): Primary | ICD-10-CM

## (undated) DEVICE — TRAY PROCED CUSTOM GASTROINTESTINAL

## (undated) DEVICE — HYDROPHILIC COATED RED RUBBER URETHRAL CATHETER, SMOOTH ROUNDED TIP, 20 FR (6.7 MM): Brand: DOVER

## (undated) DEVICE — NEEDLE HYPO 25GA L1.5IN BLU POLYPR HUB S STL REG BVL STR

## (undated) DEVICE — TROCAR: Brand: KII FIOS FIRST ENTRY

## (undated) DEVICE — STAPLER EXT 65MM S STL AUTO DISP PURSTRING

## (undated) DEVICE — TROCAR: Brand: KII SLEEVE

## (undated) DEVICE — YANKAUER,POOLE TIP,STERILE,50/CS: Brand: MEDLINE

## (undated) DEVICE — [HIGH FLOW INSUFFLATOR,  DO NOT USE IF PACKAGE IS DAMAGED,  KEEP DRY,  KEEP AWAY FROM SUNLIGHT,  PROTECT FROM HEAT AND RADIOACTIVE SOURCES.]: Brand: PNEUMOSURE

## (undated) DEVICE — GLOVE SURG SZ 65 THK91MIL LTX FREE SYN POLYISOPRENE

## (undated) DEVICE — NDL CNTR 40CT FM MAG: Brand: MEDLINE INDUSTRIES, INC.

## (undated) DEVICE — GOWN,SIRUS,NONRNF,SETINSLV,XL,20/CS: Brand: MEDLINE

## (undated) DEVICE — SUTURE BAG: Brand: DEVON

## (undated) DEVICE — SEALER ENDOSCP NANO COAT OPN DIV CRV L JAW LIGASURE IMPACT

## (undated) DEVICE — PITCHER PT 1200ML W HNDL CSR WRP

## (undated) DEVICE — SPONGE LAP W18XL18IN WHT COT 4 PLY FLD STRUNG RADPQ DISP ST

## (undated) DEVICE — APPLICATOR MEDICATED 26 CC SOLUTION HI LT ORNG CHLORAPREP

## (undated) DEVICE — SET MAJOR INSTR HOUSE

## (undated) DEVICE — RELOAD STPL L75MM OPN H3.8MM CLS 1.5MM WIRE DIA0.2MM REG

## (undated) DEVICE — MARKER,SKIN,WI/RULER AND LABELS: Brand: MEDLINE

## (undated) DEVICE — SYRINGE MED 10ML TRNSLUC BRL PLUNG BLK MRK POLYPR CTRL

## (undated) DEVICE — Device

## (undated) DEVICE — Z INACTIVE USE 2660664 SOLUTION IRRIG 3000ML 0.9% SOD CHL USP UROMATIC PLAS CONT

## (undated) DEVICE — TOTAL TRAY, 16FR 10ML SIL FOLEY, URN: Brand: MEDLINE

## (undated) DEVICE — PMI PTFE COATED LAPAROSCOPIC WIRE L-HOOK 44 CM: Brand: PMI

## (undated) DEVICE — 3M™ IOBAN™ 2 ANTIMICROBIAL INCISE DRAPE 6640EZ: Brand: IOBAN™ 2

## (undated) DEVICE — TOWEL,OR,DSP,ST,BLUE,STD,6/PK,12PK/CS: Brand: MEDLINE

## (undated) DEVICE — Z DISCONTINUED USE 2716238 PER MEDLINE STAPLER INT L40MM DIA4.7MM GRN CRV HD B FRM TECHNOLOGY DISP

## (undated) DEVICE — CAMERA STRYKER 1488 HD GEN

## (undated) DEVICE — BLADE ES ELASTOMERIC COAT INSUL DURABLE BEND UPTO 90DEG

## (undated) DEVICE — GARMENT,MEDLINE,DVT,INT,CALF,MED, GEN2: Brand: MEDLINE

## (undated) DEVICE — GLOVE ORANGE PI 7 1/2   MSG9075

## (undated) DEVICE — COVER,TABLE,44X90,STERILE: Brand: MEDLINE

## (undated) DEVICE — Z DISCONTINUED USE 2716239 STAPLER INT STPL 51MM CUT LN L40MM STD TISS CRV CUT CR40B

## (undated) DEVICE — BASIC SINGLE BASIN 1-LF: Brand: MEDLINE INDUSTRIES, INC.

## (undated) DEVICE — INSUFFLATION NEEDLE TO ESTABLISH PNEUMOPERITONEUM.: Brand: INSUFFLATION NEEDLE

## (undated) DEVICE — GENERATOR ELECSURG FORCETRAID

## (undated) DEVICE — STAPLER INT L75MM CUT LN L73MM STPL LN L77MM BLU B FRM 8

## (undated) DEVICE — ELECTRODE PT RET AD L9FT HI MOIST COND ADH HYDRGEL CORDED

## (undated) DEVICE — PUMP SUC IRR TBNG L10FT W/ HNDPC ASSEMB STRYKEFLOW 2

## (undated) DEVICE — COLOSTOMY/ILEOSTOMY KIT,FLEXWEAR: Brand: NEW IMAGE

## (undated) DEVICE — LAPAROSCOPIC SCISSORS: Brand: EPIX LAPAROSCOPIC SCISSORS

## (undated) DEVICE — PLUMEPORT LAPAROSCOPIC SMOKE FILTRATION DEVICE: Brand: PLUMEPORT ACTIV

## (undated) DEVICE — COVER,LIGHT HANDLE,FLX,1/PK: Brand: MEDLINE INDUSTRIES, INC.

## (undated) DEVICE — DOUBLE BASIN SET: Brand: MEDLINE INDUSTRIES, INC.

## (undated) DEVICE — KIT BEDSIDE REVITAL OX 500ML

## (undated) DEVICE — PACK SURG LAP CHOLE CUSTOM

## (undated) DEVICE — SHEET,DRAPE,40X58,STERILE: Brand: MEDLINE

## (undated) DEVICE — SPONGE,LAP,12"X12",XR,ST,5/PK,40PK/CS: Brand: MEDLINE

## (undated) DEVICE — SEALER/DIVIDER LAP SHFT L44CM DIA5MM STR BLNT TIP JAW HND

## (undated) DEVICE — YANKAUER,BULB TIP,W/O VENT,RIGID,STERILE: Brand: MEDLINE

## (undated) DEVICE — 40586 ADVANCED TRENDELENBURG POSITIONING KIT: Brand: 40586 ADVANCED TRENDELENBURG POSITIONING KIT